# Patient Record
Sex: MALE | URBAN - NONMETROPOLITAN AREA
[De-identification: names, ages, dates, MRNs, and addresses within clinical notes are randomized per-mention and may not be internally consistent; named-entity substitution may affect disease eponyms.]

---

## 2019-04-18 ENCOUNTER — APPOINTMENT (OUTPATIENT)
Age: 84
Setting detail: DERMATOLOGY
End: 2019-04-18

## 2019-04-18 DIAGNOSIS — Z85.828 PERSONAL HISTORY OF OTHER MALIGNANT NEOPLASM OF SKIN: ICD-10-CM

## 2019-04-18 DIAGNOSIS — L57.8 OTHER SKIN CHANGES DUE TO CHRONIC EXPOSURE TO NONIONIZING RADIATION: ICD-10-CM

## 2019-04-18 DIAGNOSIS — L73.8 OTHER SPECIFIED FOLLICULAR DISORDERS: ICD-10-CM

## 2019-04-18 DIAGNOSIS — D485 NEOPLASM OF UNCERTAIN BEHAVIOR OF SKIN: ICD-10-CM

## 2019-04-18 DIAGNOSIS — L57.0 ACTINIC KERATOSIS: ICD-10-CM

## 2019-04-18 DIAGNOSIS — L82.1 OTHER SEBORRHEIC KERATOSIS: ICD-10-CM

## 2019-04-18 PROBLEM — I10 ESSENTIAL (PRIMARY) HYPERTENSION: Status: ACTIVE | Noted: 2019-04-18

## 2019-04-18 PROBLEM — D48.5 NEOPLASM OF UNCERTAIN BEHAVIOR OF SKIN: Status: ACTIVE | Noted: 2019-04-18

## 2019-04-18 PROCEDURE — OTHER COUNSELING: OTHER

## 2019-04-18 PROCEDURE — OTHER BIOPSY BY SHAVE METHOD: OTHER

## 2019-04-18 PROCEDURE — 11103 TANGNTL BX SKIN EA SEP/ADDL: CPT

## 2019-04-18 PROCEDURE — 11102 TANGNTL BX SKIN SINGLE LES: CPT | Mod: 59

## 2019-04-18 PROCEDURE — OTHER LIQUID NITROGEN: OTHER

## 2019-04-18 PROCEDURE — OTHER MIPS QUALITY: OTHER

## 2019-04-18 PROCEDURE — 99203 OFFICE O/P NEW LOW 30 MIN: CPT | Mod: 25

## 2019-04-18 PROCEDURE — 17004 DESTROY PREMAL LESIONS 15/>: CPT

## 2019-04-18 ASSESSMENT — LOCATION SIMPLE DESCRIPTION DERM
LOCATION SIMPLE: LEFT EAR
LOCATION SIMPLE: RIGHT FOREHEAD
LOCATION SIMPLE: NOSE
LOCATION SIMPLE: LEFT UPPER BACK
LOCATION SIMPLE: LEFT TEMPLE
LOCATION SIMPLE: CHEST
LOCATION SIMPLE: LEFT NOSE
LOCATION SIMPLE: RIGHT UPPER BACK
LOCATION SIMPLE: LEFT FOREHEAD
LOCATION SIMPLE: SCALP
LOCATION SIMPLE: RIGHT EAR
LOCATION SIMPLE: RIGHT CHEEK

## 2019-04-18 ASSESSMENT — LOCATION DETAILED DESCRIPTION DERM
LOCATION DETAILED: LEFT SUPERIOR PARIETAL SCALP
LOCATION DETAILED: LEFT INFERIOR FOREHEAD
LOCATION DETAILED: RIGHT FOREHEAD
LOCATION DETAILED: RIGHT SCAPHA
LOCATION DETAILED: LEFT SCAPHA
LOCATION DETAILED: LEFT LATERAL SUPERIOR CHEST
LOCATION DETAILED: LEFT CENTRAL TEMPLE
LOCATION DETAILED: LEFT MEDIAL SUPERIOR CHEST
LOCATION DETAILED: LEFT NASAL ALA
LOCATION DETAILED: LEFT ANTERIOR EARLOBE
LOCATION DETAILED: LEFT INFERIOR UPPER BACK
LOCATION DETAILED: LEFT INFERIOR MEDIAL UPPER BACK
LOCATION DETAILED: RIGHT CENTRAL MALAR CHEEK
LOCATION DETAILED: LEFT FOREHEAD
LOCATION DETAILED: LEFT NASAL DORSUM
LOCATION DETAILED: LEFT MEDIAL TEMPLE
LOCATION DETAILED: RIGHT NASAL DORSUM
LOCATION DETAILED: RIGHT LATERAL INFERIOR CHEST
LOCATION DETAILED: RIGHT CENTRAL BUCCAL CHEEK
LOCATION DETAILED: RIGHT SUPERIOR UPPER BACK
LOCATION DETAILED: RIGHT INFERIOR HELIX
LOCATION DETAILED: RIGHT INFERIOR FOREHEAD

## 2019-04-18 ASSESSMENT — LOCATION ZONE DERM
LOCATION ZONE: FACE
LOCATION ZONE: SCALP
LOCATION ZONE: EAR
LOCATION ZONE: TRUNK
LOCATION ZONE: NOSE

## 2019-04-18 NOTE — PROCEDURE: BIOPSY BY SHAVE METHOD
Bill 25000 For Specimen Handling/Conveyance To Laboratory?: no
Wound Care: Aquaphor
Type Of Destruction Used: Curettage
Biopsy Type: H and E
Anesthesia Volume In Cc: 0.5
Notification Instructions: Patient will be notified of biopsy results. However, patient instructed to call the office if not contacted within 2 weeks.
Dressing: bandage
Size Of Lesion In Cm: 0.4
Depth Of Biopsy: dermis
X Size Of Lesion In Cm: 0
Electrodesiccation And Curettage Text: The wound bed was treated with electrodesiccation and curettage after the biopsy was performed.
Hemostasis: Drysol
Size Of Lesion In Cm: 0.3
Biopsy Method: Dermablade
Silver Nitrate Text: The wound bed was treated with silver nitrate after the biopsy was performed.
Curettage Text: The wound bed was treated with curettage after the biopsy was performed.
Consent: Written consent was obtained and risks were reviewed including but not limited to scarring, infection, bleeding, scabbing, incomplete removal, nerve damage and allergy to anesthesia.
Was A Bandage Applied: Yes
Detail Level: Detailed
Billing Type: Third-Party Bill
Post-Care Instructions: I reviewed with the patient in detail post-care instructions. Patient is to keep the biopsy site dry overnight, and then apply bacitracin twice daily until healed. Patient may apply hydrogen peroxide soaks to remove any crusting.
Electrodesiccation Text: The wound bed was treated with electrodesiccation after the biopsy was performed.
Anesthesia Type: 1% lidocaine without epinephrine
Cryotherapy Text: The wound bed was treated with cryotherapy after the biopsy was performed.

## 2019-10-17 ENCOUNTER — APPOINTMENT (OUTPATIENT)
Age: 84
Setting detail: DERMATOLOGY
End: 2019-10-17

## 2019-10-17 DIAGNOSIS — D485 NEOPLASM OF UNCERTAIN BEHAVIOR OF SKIN: ICD-10-CM

## 2019-10-17 DIAGNOSIS — D18.0 HEMANGIOMA: ICD-10-CM

## 2019-10-17 DIAGNOSIS — L57.8 OTHER SKIN CHANGES DUE TO CHRONIC EXPOSURE TO NONIONIZING RADIATION: ICD-10-CM

## 2019-10-17 DIAGNOSIS — L82.1 OTHER SEBORRHEIC KERATOSIS: ICD-10-CM

## 2019-10-17 DIAGNOSIS — Z85.828 PERSONAL HISTORY OF OTHER MALIGNANT NEOPLASM OF SKIN: ICD-10-CM

## 2019-10-17 PROBLEM — D48.5 NEOPLASM OF UNCERTAIN BEHAVIOR OF SKIN: Status: ACTIVE | Noted: 2019-10-17

## 2019-10-17 PROBLEM — H91.90 UNSPECIFIED HEARING LOSS, UNSPECIFIED EAR: Status: ACTIVE | Noted: 2019-10-17

## 2019-10-17 PROBLEM — D18.01 HEMANGIOMA OF SKIN AND SUBCUTANEOUS TISSUE: Status: ACTIVE | Noted: 2019-10-17

## 2019-10-17 PROCEDURE — 99214 OFFICE O/P EST MOD 30 MIN: CPT | Mod: 25

## 2019-10-17 PROCEDURE — OTHER BIOPSY BY SHAVE METHOD: OTHER

## 2019-10-17 PROCEDURE — OTHER COUNSELING: OTHER

## 2019-10-17 PROCEDURE — 11102 TANGNTL BX SKIN SINGLE LES: CPT

## 2019-10-17 PROCEDURE — OTHER MIPS QUALITY: OTHER

## 2019-10-17 ASSESSMENT — LOCATION DETAILED DESCRIPTION DERM
LOCATION DETAILED: EPIGASTRIC SKIN
LOCATION DETAILED: PERIUMBILICAL SKIN
LOCATION DETAILED: LEFT INFERIOR UPPER BACK
LOCATION DETAILED: LEFT MEDIAL SUPERIOR CHEST
LOCATION DETAILED: LEFT MEDIAL MALAR CHEEK

## 2019-10-17 ASSESSMENT — LOCATION SIMPLE DESCRIPTION DERM
LOCATION SIMPLE: ABDOMEN
LOCATION SIMPLE: LEFT UPPER BACK
LOCATION SIMPLE: CHEST
LOCATION SIMPLE: LEFT CHEEK

## 2019-10-17 ASSESSMENT — LOCATION ZONE DERM
LOCATION ZONE: TRUNK
LOCATION ZONE: FACE

## 2019-10-17 NOTE — PROCEDURE: BIOPSY BY SHAVE METHOD
Bill For Surgical Tray: no
Depth Of Biopsy: dermis
Cryotherapy Text: The wound bed was treated with cryotherapy after the biopsy was performed.
Post-Care Instructions: I reviewed with the patient in detail post-care instructions. Patient is to keep the biopsy site dry overnight, and then apply bacitracin twice daily until healed. Patient may apply hydrogen peroxide soaks to remove any crusting.
Curettage Text: The wound bed was treated with curettage after the biopsy was performed.
Dressing: bandage
Biopsy Type: H and E
Detail Level: Detailed
Silver Nitrate Text: The wound bed was treated with silver nitrate after the biopsy was performed.
X Size Of Lesion In Cm: 0
Wound Care: Petrolatum
Electrodesiccation Text: The wound bed was treated with electrodesiccation after the biopsy was performed.
Size Of Lesion In Cm: 0.3
Type Of Destruction Used: Curettage
Electrodesiccation And Curettage Text: The wound bed was treated with electrodesiccation and curettage after the biopsy was performed.
Hemostasis: Drysol
Billing Type: Third-Party Bill
Consent: Written consent was obtained and risks were reviewed including but not limited to scarring, infection, bleeding, scabbing, incomplete removal, nerve damage and allergy to anesthesia. Clinical evaluation reveals changes suspicious for rule out provided in path req. A skin biopsy is considered a necessary and appropriate to clarify the diagnosis. A biopsy is performed at the time of visit by technique using using a scalpel blade.
Was A Bandage Applied: Yes
Notification Instructions: Patient will be notified of biopsy results. However, patient instructed to call the office if not contacted within 2 weeks.
Biopsy Method: Dermablade
Anesthesia Type: 1% lidocaine with epinephrine

## 2020-05-06 ENCOUNTER — APPOINTMENT (OUTPATIENT)
Age: 85
Setting detail: DERMATOLOGY
End: 2020-05-06

## 2020-05-06 VITALS — TEMPERATURE: 97.6 F

## 2020-05-06 DIAGNOSIS — L82.1 OTHER SEBORRHEIC KERATOSIS: ICD-10-CM

## 2020-05-06 DIAGNOSIS — Z85.828 PERSONAL HISTORY OF OTHER MALIGNANT NEOPLASM OF SKIN: ICD-10-CM

## 2020-05-06 DIAGNOSIS — L57.8 OTHER SKIN CHANGES DUE TO CHRONIC EXPOSURE TO NONIONIZING RADIATION: ICD-10-CM

## 2020-05-06 DIAGNOSIS — L57.0 ACTINIC KERATOSIS: ICD-10-CM

## 2020-05-06 DIAGNOSIS — Z71.89 OTHER SPECIFIED COUNSELING: ICD-10-CM

## 2020-05-06 DIAGNOSIS — D485 NEOPLASM OF UNCERTAIN BEHAVIOR OF SKIN: ICD-10-CM

## 2020-05-06 PROBLEM — D48.5 NEOPLASM OF UNCERTAIN BEHAVIOR OF SKIN: Status: ACTIVE | Noted: 2020-05-06

## 2020-05-06 PROCEDURE — 17000 DESTRUCT PREMALG LESION: CPT | Mod: 59

## 2020-05-06 PROCEDURE — OTHER COUNSELING: OTHER

## 2020-05-06 PROCEDURE — 11102 TANGNTL BX SKIN SINGLE LES: CPT

## 2020-05-06 PROCEDURE — OTHER LIQUID NITROGEN: OTHER

## 2020-05-06 PROCEDURE — OTHER MIPS QUALITY: OTHER

## 2020-05-06 PROCEDURE — 99214 OFFICE O/P EST MOD 30 MIN: CPT | Mod: 25

## 2020-05-06 PROCEDURE — 17003 DESTRUCT PREMALG LES 2-14: CPT

## 2020-05-06 PROCEDURE — OTHER BIOPSY BY SHAVE METHOD: OTHER

## 2020-05-06 ASSESSMENT — LOCATION SIMPLE DESCRIPTION DERM
LOCATION SIMPLE: NECK
LOCATION SIMPLE: NOSE
LOCATION SIMPLE: POSTERIOR SCALP
LOCATION SIMPLE: CHEST
LOCATION SIMPLE: LEFT UPPER BACK

## 2020-05-06 ASSESSMENT — LOCATION DETAILED DESCRIPTION DERM
LOCATION DETAILED: MID-OCCIPITAL SCALP
LOCATION DETAILED: LEFT SUPERIOR LATERAL NECK
LOCATION DETAILED: LEFT LATERAL SUPERIOR CHEST
LOCATION DETAILED: LEFT INFERIOR MEDIAL UPPER BACK
LOCATION DETAILED: RIGHT NASAL DORSUM

## 2020-05-06 ASSESSMENT — LOCATION ZONE DERM
LOCATION ZONE: SCALP
LOCATION ZONE: NOSE
LOCATION ZONE: NECK
LOCATION ZONE: TRUNK

## 2020-05-06 NOTE — PROCEDURE: BIOPSY BY SHAVE METHOD
Bill For Surgical Tray: no
Anesthesia Type: 1% lidocaine without epinephrine
Electrodesiccation Text: The wound bed was treated with electrodesiccation after the biopsy was performed.
Notification Instructions: Patient will be notified of biopsy results. However, patient instructed to call the office if not contacted within 2 weeks.
Detail Level: Detailed
Consent: Written consent was obtained and risks were reviewed including but not limited to scarring, infection, bleeding, scabbing, incomplete removal, nerve damage and allergy to anesthesia.
Size Of Lesion In Cm: 0.3
Biopsy Method: Dermablade
Dressing: bandage
Curettage Text: The wound bed was treated with curettage after the biopsy was performed.
Billing Type: Third-Party Bill
Was A Bandage Applied: Yes
Electrodesiccation And Curettage Text: The wound bed was treated with electrodesiccation and curettage after the biopsy was performed.
Information: Selecting Yes will display possible errors in your note based on the variables you have selected. This validation is only offered as a suggestion for you. PLEASE NOTE THAT THE VALIDATION TEXT WILL BE REMOVED WHEN YOU FINALIZE YOUR NOTE. IF YOU WANT TO FAX A PRELIMINARY NOTE YOU WILL NEED TO TOGGLE THIS TO 'NO' IF YOU DO NOT WANT IT IN YOUR FAXED NOTE.
Type Of Destruction Used: Curettage
Biopsy Type: H and E
X Size Of Lesion In Cm: 0
Wound Care: Petrolatum
Cryotherapy Text: The wound bed was treated with cryotherapy after the biopsy was performed.
Post-Care Instructions: I reviewed with the patient in detail post-care instructions. Patient is to keep the biopsy site dry overnight, and then apply bacitracin twice daily until healed. Patient may apply hydrogen peroxide soaks to remove any crusting.
Anesthesia Volume In Cc: 1
Depth Of Biopsy: dermis
Hemostasis: Drysol
Silver Nitrate Text: The wound bed was treated with silver nitrate after the biopsy was performed.

## 2020-05-06 NOTE — PROCEDURE: LIQUID NITROGEN
Render Post-Care Instructions In Note?: no
Consent: The patient's consent was obtained including but not limited to risks of crusting, scabbing, blistering, scarring, darker or lighter pigmentary change, recurrence, incomplete removal and infection.
Post-Care Instructions: I reviewed with the patient in detail post-care instructions. Patient is to wear sunprotection, and avoid picking at any of the treated lesions. Pt may apply Vaseline to crusted or scabbing areas.
Number Of Freeze-Thaw Cycles: 2 freeze-thaw cycles
Duration Of Freeze Thaw-Cycle (Seconds): 0
Detail Level: Detailed

## 2020-05-27 ENCOUNTER — APPOINTMENT (OUTPATIENT)
Age: 85
Setting detail: DERMATOLOGY
End: 2020-05-27

## 2020-05-27 ENCOUNTER — APPOINTMENT (OUTPATIENT)
Age: 85
Setting detail: DERMATOLOGY
End: 2020-06-03

## 2020-05-27 VITALS — TEMPERATURE: 98.5 F

## 2020-05-27 PROBLEM — C44.321 SQUAMOUS CELL CARCINOMA OF SKIN OF NOSE: Status: ACTIVE | Noted: 2020-05-27

## 2020-05-27 PROCEDURE — 77300 RADIATION THERAPY DOSE PLAN: CPT

## 2020-05-27 PROCEDURE — OTHER SUPERFICIAL RADIATION TREATMENT: OTHER

## 2020-05-27 PROCEDURE — 77261 THER RADIOLOGY TX PLNG SMPL: CPT

## 2020-05-27 PROCEDURE — OTHER TREATMENT REGIMEN: OTHER

## 2020-05-27 PROCEDURE — G6001 ECHO GUIDANCE RADIOTHERAPY: HCPCS

## 2020-05-27 PROCEDURE — 99213 OFFICE O/P EST LOW 20 MIN: CPT | Mod: 25

## 2020-05-27 PROCEDURE — OTHER FOLLOW UP FOR NEXT VISIT: OTHER

## 2020-05-27 PROCEDURE — 77334 RADIATION TREATMENT AID(S): CPT

## 2020-05-27 PROCEDURE — 77280 THER RAD SIMULAJ FIELD SMPL: CPT

## 2020-05-27 NOTE — PROCEDURE: SUPERFICIAL RADIATION TREATMENT
Fractions / Week: 4
Dose Per Fractionation In Cgy (Optional): 264.88
Daily Fractionated Dose (Optional- Include Units) Rx 2: 268.28cGy
Energy (Include Units): 50kV
Number Of Treatment Days: 1
Custom Shielding Preamble Text Will Not Be Included With Simple Simulations (.......... X X Y Cm): A lead shield of 0.762 mm thickness is utilized to form a molded, custom shield with a
Field Size (Applicator): 2.5 cm
Validate Note Data (See Information Below): Yes
Total Number Of Fractions Rx 3: 15
Total Dose (Optional-Please Include Units): 5297.6cGy
Fractions / Week Rx 2: 3
Energy (Optional-Please Include Units): 70kV
Fractionation Number (Evaluation): 5
Treatment Device Design After Initial Simulation Justification (Will Render If Bill For Treatment Devices = Yes): The patient is status post radiation simulation and is evaluated as to the use of additional devices for shielding and placement for radiation therapy.
Field Size (Applicator) Rx 2: 2.0 cm
Bill For Treatment Devices Only: No
Treatment Time / Fractionation (Optional- Include Units): 0.43min
Total Dose (Optional-Please Include Units) Rx 2: 2682.8cGy
Dose / Tx In Cgy (Optional): 383.5
Simple Simulation Preamble Text Will Be Included With Simple Simulations (.......... Indications): Simple simulation was performed today for the following reasons:
Treatment Time / Fractionation (Optional- Include Units) Rx 2: 0.38min
Detail Level: Detailed
Time Dose Fractionation (Optional- Include Units If Applicable): 97
Intro Statement (Will Not Render If Left Blank): The patient is undergoing superficial radiation therapy for skin cancer and presents for weekly evaluation and management.  Per protocol and as documented on the flow sheet, the patient was questioned as to subjective redness, pruritus, pain, drainage, fatigue, or any other symptoms.  Objectively, the radiation area was evaluated with regards to erythema, atrophy, scale, crusting, erosion, ulceration, edema, purpura, tenderness, warmth, drainage, and any other findings.  The plan was extensively reviewed including the dose, and dosing schedule.  The simulation and clinical setup was also reviewed as was the external and any internal shields and based on this review the appropriateness and sufficiency of treatment was determined.
Custom Shielding Afterword Text Will Not Be Included With Simple Simulations (X X Y Cm............): port to correlate with the lesion size, including treatment margin. The custom lead shield is adequate to accommodate the appropriate applicator and provide adequate shielding around the treatment site. Additional shielding (as noted below) is used to protect sensitive, normal tissues.
Patient Positioning: Supine
Depth (Optional-Please Include Units): 0.8mm
Port Dimensions-X Axis In Cm: 2
Assessment: Appropriate reaction
Time Dose Fractionation (Optional- Include Units If Applicable) Rx 2: 47
Functional Status: 0 (fully active)
Shielding Size (Optional- Include Units): 2.0cm x 2.0cm
Information: Selecting Yes will display possible errors in your note based on the variables you have selected. This validation is only offered as a suggestion for you. PLEASE NOTE THAT THE VALIDATION TEXT WILL BE REMOVED WHEN YOU FINALIZE YOUR NOTE. IF YOU WANT TO FAX A PRELIMINARY NOTE YOU WILL NEED TO TOGGLE THIS TO 'NO' IF YOU DO NOT WANT IT IN YOUR FAXED NOTE.
Total Number Of Fractions: 20
Depth (Optional-Please Include Units) Rx 2: 0.7mm
Simple Simulation Afterword Text Will Be Included With Simple Simulations (Indications............): The patient had a complete consultation regarding all applicable modalities for the treatment of their skin cancer and based on a variety of factors including the type of tumor, size, and location, the relevant medical history as well as local tissue factors, the functional status of the individual, the ability to perform necessary postoperative wound instructions and the need for simultaneous treatments as well as overall wound healing status, it was determined that the patient would begin radiation therapy treatment for skin cancer.  A full simulation and treatment device design was performed including the determination and formulation of appropriate simple and complex devices including lead shield of 0.762 mm thickness to form molded customized shielding to specifically correlate with the lesion size including treatment margin.  The custom lead shield is adequate to accommodate the appropriate applicator and provide adequate shielding around the treatment site.  The specific field applicator, shields, and devices both simple and complex as well as the specific patient setup is outlined below.  The patient was given a full consent for superficial radiation to both verbally and in writing and the full determination of patient's eligibility for treatment and selection is outlined on the patient eligibility and treatment selection form.  The specific superficial radiotherapy prescription was determined and was documented on the superficial radiotherapy prescription form.  A treatment calculation was also performed and documented on the treatment calculation form.  Based on the prescription, the patient was scheduled for a series of fractional treatments.
Treatment Margins In Cm: 0.5
Daily Fractionated Dose (Optional- Include Units): 264.88cGy
Shielding Size (Optional- Include Units) Rx 2: 1.5cm x 1.5cm
Computed Treatment Time In Min (Will Render The Same As Calculated Treatment Time If Left Blank): 0.43
Total Number Of Fractions Rx 2: 10
Treatment Time In Min (Optional): 0.50
Fractionation Number: 0

## 2020-05-27 NOTE — PROCEDURE: TREATMENT REGIMEN
Plan: Per the request of Dr. Esteban, patient was seen today for Superficial Radiation Therapy requiring simulation (CPT® 17584) in preparation for treatment of specific diseased site(s). Simulation is necessary to determine correct patient and treatment portal positioning, deliver safe and effective radiation therapy. A high frequency ultrasound image was acquired today for three-dimensional evaluation of tumor volume and response to treatment, in addition, geometric accuracy of field placement (CPT® ). Physician evaluation of the ultrasound tumor depth will be ongoing through course of treatment and is deemed medically necessary ensuring efficacy of treatment. Today’s image and setup was evaluated determining continuation of treatment with the current plan, or necessary changes as appropriate. All appropriate custom blocking and treatment parameters verified by the Radiation Therapist\\n\\nToday’s visit is for Simulation and planning for radiation therapy.  Question were answered and concerns were address.  Patient was evaluated based on listed criteria and is a suitable candidate to begin SRT.  Ultrasound was used to confirm treatment location and determine depth of treatment.  Depth: 0.8mm\\n
Detail Level: Detailed

## 2020-06-01 ENCOUNTER — APPOINTMENT (OUTPATIENT)
Age: 85
Setting detail: DERMATOLOGY
End: 2020-06-03

## 2020-06-01 PROBLEM — C44.321 SQUAMOUS CELL CARCINOMA OF SKIN OF NOSE: Status: ACTIVE | Noted: 2020-06-01

## 2020-06-01 PROCEDURE — OTHER TREATMENT REGIMEN: OTHER

## 2020-06-01 PROCEDURE — OTHER FOLLOW UP FOR NEXT VISIT: OTHER

## 2020-06-01 PROCEDURE — 77280 THER RAD SIMULAJ FIELD SMPL: CPT

## 2020-06-01 PROCEDURE — G6001 ECHO GUIDANCE RADIOTHERAPY: HCPCS

## 2020-06-01 PROCEDURE — OTHER SUPERFICIAL RADIATION TREATMENT: OTHER

## 2020-06-01 PROCEDURE — 77401 RADIATION TX DELIVERY SUPFC: CPT

## 2020-06-01 NOTE — PROCEDURE: SUPERFICIAL RADIATION TREATMENT
Bill And Render Text From Evaluation And Management Tab (Will Bill [id number]): No
Intro Statement (Will Not Render If Left Blank): The patient is undergoing superficial radiation therapy for skin cancer and presents for weekly evaluation and management.  Per protocol and as documented on the flow sheet, the patient was questioned as to subjective redness, pruritus, pain, drainage, fatigue, or any other symptoms.  Objectively, the radiation area was evaluated with regards to erythema, atrophy, scale, crusting, erosion, ulceration, edema, purpura, tenderness, warmth, drainage, and any other findings.  The plan was extensively reviewed including the dose, and dosing schedule.  The simulation and clinical setup was also reviewed as was the external and any internal shields and based on this review the appropriateness and sufficiency of treatment was determined.
Computed Treatment Time In Min (Will Render The Same As Calculated Treatment Time If Left Blank): 0.43
Fractionation Number: 1
Functional Status: 0 (fully active)
Time Dose Fractionation (Optional- Include Units If Applicable): 97
Custom Shielding Preamble Text Will Not Be Included With Simple Simulations (.......... X X Y Cm): A lead shield of 0.762 mm thickness is utilized to form a molded, custom shield with a
Fractions / Week Rx 2: 3
Assessment: Appropriate reaction
Treatment Device Design After Initial Simulation Justification (Will Render If Bill For Treatment Devices = Yes): The patient is status post radiation simulation and is evaluated as to the use of additional devices for shielding and placement for radiation therapy.
Treatment Margins In Cm: 0.5
Total Dose (Optional-Please Include Units): 5297.6cGy
Depth (Optional-Please Include Units): 0.8mm
Day Of The Week Treatment Administered: Monday
Detail Level: Detailed
Treatment Time / Fractionation (Optional- Include Units) Rx 2: 0.38min
Daily Fractionated Dose (Optional- Include Units) Rx 2: 268.28cGy
Total Number Of Fractions: 20
Energy (Optional-Please Include Units) Rx 2: 50kV
Depth (Optional-Please Include Units) Rx 2: 0.7mm
Field Size (Applicator): 2.5 cm
Fractions / Week: 4
Simple Simulation Preamble Text Will Be Included With Simple Simulations (.......... Indications): Simple simulation was performed today for the following reasons:
Dose Per Fractionation In Cgy (Optional): 264.88
Time Dose Fractionation (Optional- Include Units If Applicable) Rx 2: 47
Patient Positioning: Supine
Initial Radiation Treatment Planning (Will Render If Bill Simulation = Yes): The patient had a complete consultation regarding all applicable modalities for the treatment of their skin cancer and based on a variety of factors including the type of tumor, size, and location, the relevant medical history as well as local tissue factors, the functional status of the individual, the ability to perform necessary postoperative wound instructions and the need for simultaneous treatments as well as overall wound healing status, it was determined that the patient would begin radiation therapy treatment for skin cancer.  A full simulation and treatment device design was performed including the determination and formulation of appropriate simple and complex devices including lead shield of 0.762 mm thickness to form molded customized shielding to specifically correlate with the lesion size including treatment margin.  The custom lead shield is adequate to accommodate the appropriate applicator and provide adequate shielding around the treatment site.  The specific field applicator, shields, and devices both simple and complex as well as the specific patient setup is outlined below.  The patient was given a full consent for superficial radiation to both verbally and in writing and the full determination of patient's eligibility for treatment and selection is outlined on the patient eligibility and treatment selection form.  The specific superficial radiotherapy prescription was determined and was documented on the superficial radiotherapy prescription form.  A treatment calculation was also performed and documented on the treatment calculation form.  Based on the prescription, the patient was scheduled for a series of fractional treatments.
Information: Selecting Yes will display possible errors in your note based on the variables you have selected. This validation is only offered as a suggestion for you. PLEASE NOTE THAT THE VALIDATION TEXT WILL BE REMOVED WHEN YOU FINALIZE YOUR NOTE. IF YOU WANT TO FAX A PRELIMINARY NOTE YOU WILL NEED TO TOGGLE THIS TO 'NO' IF YOU DO NOT WANT IT IN YOUR FAXED NOTE.
Daily Fractionated Dose (Optional- Include Units): 264.88cGy
Custom Shielding Afterword Text Will Not Be Included With Simple Simulations (X X Y Cm............): port to correlate with the lesion size, including treatment margin. The custom lead shield is adequate to accommodate the appropriate applicator and provide adequate shielding around the treatment site. Additional shielding (as noted below) is used to protect sensitive, normal tissues.
Fractionation Number (Evaluation): 5
Shielding Size (Optional- Include Units) Rx 2: 1.5cm x 1.5cm
Total Dose (Optional-Please Include Units) Rx 2: 2682.8cGy
Port Dimensions-X Axis In Cm: 2
Treatment Time / Fractionation (Optional- Include Units): 0.43min
Total Number Of Fractions Rx 3: 15
Shielding Size (Optional- Include Units): 2.0cm x 2.0cm
Energy (Include Units): 70kV
Bill For Radiation Treatment: Yes
Field Size (Applicator) Rx 2: 2.0 cm
Total Number Of Fractions Rx 2: 10

## 2020-06-01 NOTE — PROCEDURE: TREATMENT REGIMEN
Detail Level: Detailed
Plan: This patient has been treated today with superficial radiation therapy for non-melanoma skin cancer.    \\n\\nWritten informed consent has been previously obtained from this patient for this treatment.  This consent is documented in the patient’s chart.  The patient gave verbal consent to continue treatment today. \\n\\nThe patient was treated with a specific radiation dose and setup as prescribed by the provider listed on this visit note.  A Radiation Therapist performed administration of radiation. The treatment parameters and cumulative dose are indicated above.  \\n\\nPrior to administering the radiation, the patient underwent a verification simulation defining relevant normal and abnormal target anatomy and acquiring images and data necessary to develop optimal radiation treatment process for the patient. This process includes verification of the treatment port(s) and proper treatment positioning.  All treatment ports were photographed.  The patient’s customized lead blocking was confirmed.   Considering, superficial radiotherapy setup is a clinical setup, this requires physician and radiation therapist to clarify location interest being treated against initial images, pathology and patient anatomy. Care was taken ensuring fields treated were geometrically accurate and properly positioned using daily verification simulation.  This process is also utilized to determine if any changes are necessary.   These steps are therefore medically necessary ensuring safe and effective administration of radiation. \\n\\nA high frequency ultrasound image was acquired today for two-dimensional evaluation of the tumor volume and response to treatment, in addition to geometric accuracy of field placement. The daily field placement is separate and distinct from the initial simulation and is an important task in providing safe administration of radiation therapy. Physician evaluation of the ultrasound tumor depth will be ongoing throughout the course of treatment and is deemed medically necessary in order to ensure the efficacy of treatment. Today’s image was evaluated for determination of continuation of treatment with the current plan or with necessary changes as appropriate. Additionally, the use of visualization and targeted assessment allows the patient to be able to see their cancer(s) progress, encouraging patient to complete full course of radiotherapy.  \\n\\nUS depth is 1.04mm, which is 0.24mm in difference from previous imaging. Repop +++\\n\\nPer Dr. Esteban, continued daily US guidance and clinical setup simulation is required for field placement, measurement of tumor depth, progress and acute effect monitoring. \\n\\n

## 2020-06-02 ENCOUNTER — APPOINTMENT (OUTPATIENT)
Age: 85
Setting detail: DERMATOLOGY
End: 2020-06-03

## 2020-06-02 VITALS — TEMPERATURE: 97.3 F

## 2020-06-02 PROBLEM — C44.321 SQUAMOUS CELL CARCINOMA OF SKIN OF NOSE: Status: ACTIVE | Noted: 2020-06-02

## 2020-06-02 PROCEDURE — G6001 ECHO GUIDANCE RADIOTHERAPY: HCPCS

## 2020-06-02 PROCEDURE — OTHER TREATMENT REGIMEN: OTHER

## 2020-06-02 PROCEDURE — OTHER SUPERFICIAL RADIATION TREATMENT: OTHER

## 2020-06-02 PROCEDURE — 77401 RADIATION TX DELIVERY SUPFC: CPT

## 2020-06-02 PROCEDURE — OTHER FOLLOW UP FOR NEXT VISIT: OTHER

## 2020-06-02 PROCEDURE — 77280 THER RAD SIMULAJ FIELD SMPL: CPT

## 2020-06-02 NOTE — PROCEDURE: TREATMENT REGIMEN
Detail Level: Detailed
Plan: This patient has been treated today with superficial radiation therapy for non-melanoma skin cancer.    \\n\\nWritten informed consent has been previously obtained from this patient for this treatment.  This consent is documented in the patient’s chart.  The patient gave verbal consent to continue treatment today. \\n\\nThe patient was treated with a specific radiation dose and setup as prescribed by the provider listed on this visit note.  A Radiation Therapist performed administration of radiation. The treatment parameters and cumulative dose are indicated above.  \\n\\nPrior to administering the radiation, the patient underwent a verification simulation defining relevant normal and abnormal target anatomy and acquiring images and data necessary to develop optimal radiation treatment process for the patient. This process includes verification of the treatment port(s) and proper treatment positioning.  All treatment ports were photographed.  The patient’s customized lead blocking was confirmed.   Considering, superficial radiotherapy setup is a clinical setup, this requires physician and radiation therapist to clarify location interest being treated against initial images, pathology and patient anatomy. Care was taken ensuring fields treated were geometrically accurate and properly positioned using daily verification simulation.  This process is also utilized to determine if any changes are necessary.   These steps are therefore medically necessary ensuring safe and effective administration of radiation. \\n\\nA high frequency ultrasound image was acquired today for two-dimensional evaluation of the tumor volume and response to treatment, in addition to geometric accuracy of field placement. The daily field placement is separate and distinct from the initial simulation and is an important task in providing safe administration of radiation therapy. Physician evaluation of the ultrasound tumor depth will be ongoing throughout the course of treatment and is deemed medically necessary in order to ensure the efficacy of treatment. Today’s image was evaluated for determination of continuation of treatment with the current plan or with necessary changes as appropriate. Additionally, the use of visualization and targeted assessment allows the patient to be able to see their cancer(s) progress, encouraging patient to complete full course of radiotherapy.  \\n\\nUS depth is 0.69mm, which is -0.35mm in difference from previous imaging. Repop +++\\n\\nPer Dr. Esteban, continued daily US guidance and clinical setup simulation is required for field placement, measurement of tumor depth, progress and acute effect monitoring. \\n\\n

## 2020-06-02 NOTE — PROCEDURE: SUPERFICIAL RADIATION TREATMENT
Time Dose Fractionation (Optional- Include Units If Applicable): 97
Time Dose Fractionation (Optional- Include Units If Applicable) Rx 2: 47
Functional Status: 0 (fully active)
Shielding Size (Optional- Include Units): 2.0cm x 2.0cm
Fractionation Number (Evaluation): 5
Field Size (Applicator): 2.5 cm
Ricky For Simulation Without Treatment Device Design (Simple Simulation): No
Port Dimensions-X Axis In Cm: 2
Dose / Tx In Cgy (Optional): 264.88
Patient Positioning: Supine
Prescription Used: 1
Intro Statement (Will Not Render If Left Blank): The patient is undergoing superficial radiation therapy for skin cancer and presents for weekly evaluation and management.  Per protocol and as documented on the flow sheet, the patient was questioned as to subjective redness, pruritus, pain, drainage, fatigue, or any other symptoms.  Objectively, the radiation area was evaluated with regards to erythema, atrophy, scale, crusting, erosion, ulceration, edema, purpura, tenderness, warmth, drainage, and any other findings.  The plan was extensively reviewed including the dose, and dosing schedule.  The simulation and clinical setup was also reviewed as was the external and any internal shields and based on this review the appropriateness and sufficiency of treatment was determined.
Energy (Optional-Please Include Units): 70kV
Total Dose (Optional-Please Include Units): 5297.6cGy
Treatment Time / Fractionation (Optional- Include Units) Rx 2: 0.38min
Shielding Size (Optional- Include Units) Rx 2: 1.5cm x 1.5cm
Information: Selecting Yes will display possible errors in your note based on the variables you have selected. This validation is only offered as a suggestion for you. PLEASE NOTE THAT THE VALIDATION TEXT WILL BE REMOVED WHEN YOU FINALIZE YOUR NOTE. IF YOU WANT TO FAX A PRELIMINARY NOTE YOU WILL NEED TO TOGGLE THIS TO 'NO' IF YOU DO NOT WANT IT IN YOUR FAXED NOTE.
Treatment Device Design After Initial Simulation Justification (Will Render If Bill For Treatment Devices = Yes): The patient is status post radiation simulation and is evaluated as to the use of additional devices for shielding and placement for radiation therapy.
Simple Simulation Preamble Text Will Be Included With Simple Simulations (.......... Indications): Simple simulation was performed today for the following reasons:
Total Dose (Optional-Please Include Units) Rx 2: 2682.8cGy
Ssd In Cm (Optional): 15
Treatment Time In Min (Optional): 0.43
Fractions / Week Rx 2: 3
Daily Fractionated Dose (Optional- Include Units): 264.88cGy
Total Number Of Fractions: 20
Custom Shielding Preamble Text Will Not Be Included With Simple Simulations (.......... X X Y Cm): A lead shield of 0.762 mm thickness is utilized to form a molded, custom shield with a
Bill For Radiation Treatment: Yes
Depth (Optional-Please Include Units) Rx 2: 0.7mm
Day Of The Week Treatment Administered: Tuesday
Field Size (Applicator) Rx 2: 2.0 cm
Assessment: Appropriate reaction
Treatment Margins In Cm: 0.5
Depth (Optional-Please Include Units): 0.8mm
Total Number Of Fractions Rx 2: 10
Daily Fractionated Dose (Optional- Include Units) Rx 2: 268.28cGy
Energy (Optional-Please Include Units) Rx 2: 50kV
Treatment Time / Fractionation (Optional- Include Units): 0.43min
Cumulative Dose In Cgy (Optional): 529.76
Initial Radiation Treatment Planning (Will Render If Bill Simulation = Yes): The patient had a complete consultation regarding all applicable modalities for the treatment of their skin cancer and based on a variety of factors including the type of tumor, size, and location, the relevant medical history as well as local tissue factors, the functional status of the individual, the ability to perform necessary postoperative wound instructions and the need for simultaneous treatments as well as overall wound healing status, it was determined that the patient would begin radiation therapy treatment for skin cancer.  A full simulation and treatment device design was performed including the determination and formulation of appropriate simple and complex devices including lead shield of 0.762 mm thickness to form molded customized shielding to specifically correlate with the lesion size including treatment margin.  The custom lead shield is adequate to accommodate the appropriate applicator and provide adequate shielding around the treatment site.  The specific field applicator, shields, and devices both simple and complex as well as the specific patient setup is outlined below.  The patient was given a full consent for superficial radiation to both verbally and in writing and the full determination of patient's eligibility for treatment and selection is outlined on the patient eligibility and treatment selection form.  The specific superficial radiotherapy prescription was determined and was documented on the superficial radiotherapy prescription form.  A treatment calculation was also performed and documented on the treatment calculation form.  Based on the prescription, the patient was scheduled for a series of fractional treatments.
Detail Level: Detailed
Fractions / Week: 4
Custom Shielding Afterword Text Will Not Be Included With Simple Simulations (X X Y Cm............): port to correlate with the lesion size, including treatment margin. The custom lead shield is adequate to accommodate the appropriate applicator and provide adequate shielding around the treatment site. Additional shielding (as noted below) is used to protect sensitive, normal tissues.

## 2020-06-04 ENCOUNTER — APPOINTMENT (OUTPATIENT)
Age: 85
Setting detail: DERMATOLOGY
End: 2020-06-09

## 2020-06-04 VITALS — TEMPERATURE: 97.9 F

## 2020-06-04 PROBLEM — C44.321 SQUAMOUS CELL CARCINOMA OF SKIN OF NOSE: Status: ACTIVE | Noted: 2020-06-04

## 2020-06-04 PROCEDURE — OTHER FOLLOW UP FOR NEXT VISIT: OTHER

## 2020-06-04 PROCEDURE — 77280 THER RAD SIMULAJ FIELD SMPL: CPT

## 2020-06-04 PROCEDURE — OTHER TREATMENT REGIMEN: OTHER

## 2020-06-04 PROCEDURE — OTHER SUPERFICIAL RADIATION TREATMENT: OTHER

## 2020-06-04 PROCEDURE — 77401 RADIATION TX DELIVERY SUPFC: CPT

## 2020-06-04 PROCEDURE — G6001 ECHO GUIDANCE RADIOTHERAPY: HCPCS

## 2020-06-04 NOTE — PROCEDURE: SUPERFICIAL RADIATION TREATMENT
Energy (Optional-Please Include Units) Rx 2: 50kV
Field Size (Applicator): 2.5 cm
Include Rx 2 When Rendering Additional Prescriptions: No
Treatment Time / Fractionation (Optional- Include Units) Rx 2: 0.38min
Ssd In Cm (Optional): 15
Detail Level: Detailed
Custom Shielding Afterword Text Will Not Be Included With Simple Simulations (X X Y Cm............): port to correlate with the lesion size, including treatment margin. The custom lead shield is adequate to accommodate the appropriate applicator and provide adequate shielding around the treatment site. Additional shielding (as noted below) is used to protect sensitive, normal tissues.
Time Dose Fractionation (Optional- Include Units If Applicable): 97
Simple Simulation Preamble Text Will Be Included With Simple Simulations (.......... Indications): Simple simulation was performed today for the following reasons:
Shielding Size (Optional- Include Units) Rx 2: 1.5cm x 1.5cm
Field Size (Applicator) Rx 2: 2.0 cm
Day Of The Week Treatment Administered: Thursday
Dose / Tx In Cgy (Optional): 264.88
Computed Treatment Time In Min (Will Render The Same As Calculated Treatment Time If Left Blank): 0.43
Total Number Of Fractions Rx 2: 10
Functional Status: 0 (fully active)
Information: Selecting Yes will display possible errors in your note based on the variables you have selected. This validation is only offered as a suggestion for you. PLEASE NOTE THAT THE VALIDATION TEXT WILL BE REMOVED WHEN YOU FINALIZE YOUR NOTE. IF YOU WANT TO FAX A PRELIMINARY NOTE YOU WILL NEED TO TOGGLE THIS TO 'NO' IF YOU DO NOT WANT IT IN YOUR FAXED NOTE.
Number Of Days Off Treatment: 1
Fractionation Number: 3
Treatment Device Design After Initial Simulation Justification (Will Render If Bill For Treatment Devices = Yes): The patient is status post radiation simulation and is evaluated as to the use of additional devices for shielding and placement for radiation therapy.
Port Dimensions-X Axis In Cm: 2
Initial Radiation Treatment Planning (Will Render If Bill Simulation = Yes): The patient had a complete consultation regarding all applicable modalities for the treatment of their skin cancer and based on a variety of factors including the type of tumor, size, and location, the relevant medical history as well as local tissue factors, the functional status of the individual, the ability to perform necessary postoperative wound instructions and the need for simultaneous treatments as well as overall wound healing status, it was determined that the patient would begin radiation therapy treatment for skin cancer.  A full simulation and treatment device design was performed including the determination and formulation of appropriate simple and complex devices including lead shield of 0.762 mm thickness to form molded customized shielding to specifically correlate with the lesion size including treatment margin.  The custom lead shield is adequate to accommodate the appropriate applicator and provide adequate shielding around the treatment site.  The specific field applicator, shields, and devices both simple and complex as well as the specific patient setup is outlined below.  The patient was given a full consent for superficial radiation to both verbally and in writing and the full determination of patient's eligibility for treatment and selection is outlined on the patient eligibility and treatment selection form.  The specific superficial radiotherapy prescription was determined and was documented on the superficial radiotherapy prescription form.  A treatment calculation was also performed and documented on the treatment calculation form.  Based on the prescription, the patient was scheduled for a series of fractional treatments.
Custom Shielding Preamble Text Will Not Be Included With Simple Simulations (.......... X X Y Cm): A lead shield of 0.762 mm thickness is utilized to form a molded, custom shield with a
Energy (Optional-Please Include Units): 70kV
Daily Fractionated Dose (Optional- Include Units) Rx 2: 268.28cGy
Bill For Radiation Treatment: Yes
Treatment Time / Fractionation (Optional- Include Units): 0.43min
Shielding Size (Optional- Include Units): 2.0cm x 2.0cm
Total Number Of Fractions: 20
Fractions / Week Rx 3: 5
Depth (Optional-Please Include Units): 0.8mm
Total Dose (Optional-Please Include Units): 5297.6cGy
Intro Statement (Will Not Render If Left Blank): The patient is undergoing superficial radiation therapy for skin cancer and presents for weekly evaluation and management.  Per protocol and as documented on the flow sheet, the patient was questioned as to subjective redness, pruritus, pain, drainage, fatigue, or any other symptoms.  Objectively, the radiation area was evaluated with regards to erythema, atrophy, scale, crusting, erosion, ulceration, edema, purpura, tenderness, warmth, drainage, and any other findings.  The plan was extensively reviewed including the dose, and dosing schedule.  The simulation and clinical setup was also reviewed as was the external and any internal shields and based on this review the appropriateness and sufficiency of treatment was determined.
Time Dose Fractionation (Optional- Include Units If Applicable) Rx 2: 47
Fractions / Week: 4
Total Dose (Optional-Please Include Units) Rx 2: 2682.8cGy
Assessment: Appropriate reaction
Treatment Margins In Cm: 0.5
Depth (Optional-Please Include Units) Rx 2: 0.7mm
Cumulative Dose In Cgy (Optional): 794.64
Daily Fractionated Dose (Optional- Include Units): 264.88cGy
Patient Positioning: Supine

## 2020-06-05 ENCOUNTER — APPOINTMENT (OUTPATIENT)
Age: 85
Setting detail: DERMATOLOGY
End: 2020-06-09

## 2020-06-05 VITALS — TEMPERATURE: 97.3 F

## 2020-06-05 PROBLEM — C44.321 SQUAMOUS CELL CARCINOMA OF SKIN OF NOSE: Status: ACTIVE | Noted: 2020-06-05

## 2020-06-05 PROCEDURE — 77280 THER RAD SIMULAJ FIELD SMPL: CPT

## 2020-06-05 PROCEDURE — 77401 RADIATION TX DELIVERY SUPFC: CPT

## 2020-06-05 PROCEDURE — OTHER TREATMENT REGIMEN: OTHER

## 2020-06-05 PROCEDURE — OTHER SUPERFICIAL RADIATION TREATMENT: OTHER

## 2020-06-05 PROCEDURE — OTHER FOLLOW UP FOR NEXT VISIT: OTHER

## 2020-06-05 PROCEDURE — G6001 ECHO GUIDANCE RADIOTHERAPY: HCPCS

## 2020-06-05 NOTE — PROCEDURE: TREATMENT REGIMEN
Detail Level: Detailed
Plan: This patient has been treated today with superficial radiation therapy for non-melanoma skin cancer.    \\n\\nWritten informed consent has been previously obtained from this patient for this treatment.  This consent is documented in the patient’s chart.  The patient gave verbal consent to continue treatment today. \\n\\nThe patient was treated with a specific radiation dose and setup as prescribed by the provider listed on this visit note.  A Radiation Therapist performed administration of radiation. The treatment parameters and cumulative dose are indicated above.  \\n\\nPrior to administering the radiation, the patient underwent a verification simulation defining relevant normal and abnormal target anatomy and acquiring images and data necessary to develop optimal radiation treatment process for the patient. This process includes verification of the treatment port(s) and proper treatment positioning.  All treatment ports were photographed.  The patient’s customized lead blocking was confirmed.   Considering, superficial radiotherapy setup is a clinical setup, this requires physician and radiation therapist to clarify location interest being treated against initial images, pathology and patient anatomy. Care was taken ensuring fields treated were geometrically accurate and properly positioned using daily verification simulation.  This process is also utilized to determine if any changes are necessary.   These steps are therefore medically necessary ensuring safe and effective administration of radiation. \\n\\nA high frequency ultrasound image was acquired today for two-dimensional evaluation of the tumor volume and response to treatment, in addition to geometric accuracy of field placement. The daily field placement is separate and distinct from the initial simulation and is an important task in providing safe administration of radiation therapy. Physician evaluation of the ultrasound tumor depth will be ongoing throughout the course of treatment and is deemed medically necessary in order to ensure the efficacy of treatment. Today’s image was evaluated for determination of continuation of treatment with the current plan or with necessary changes as appropriate. Additionally, the use of visualization and targeted assessment allows the patient to be able to see their cancer(s) progress, encouraging patient to complete full course of radiotherapy.  \\n\\nUS depth is 0.87mm, which is +0.1mm in difference from previous imaging. Repop +++\\n\\nPer Dr. Esteban, continued daily US guidance and clinical setup simulation is required for field placement, measurement of tumor depth, progress and acute effect monitoring. \\n\\n

## 2020-06-05 NOTE — PROCEDURE: SUPERFICIAL RADIATION TREATMENT
Detail Level: Detailed
Include Rx 2 When Rendering Additional Prescriptions: No
Time Dose Fractionation (Optional- Include Units If Applicable): 97
Daily Fractionated Dose (Optional- Include Units): 264.88cGy
Energy (Include Units): 70kV
Fractions / Week Rx 4: 5
Custom Shielding Afterword Text Will Not Be Included With Simple Simulations (X X Y Cm............): port to correlate with the lesion size, including treatment margin. The custom lead shield is adequate to accommodate the appropriate applicator and provide adequate shielding around the treatment site. Additional shielding (as noted below) is used to protect sensitive, normal tissues.
Field Size (Applicator): 2.5 cm
Day Of The Week Treatment Administered: Friday
Ssd In Cm (Optional): 15
Number Of Days Off Treatment: 1
Information: Selecting Yes will display possible errors in your note based on the variables you have selected. This validation is only offered as a suggestion for you. PLEASE NOTE THAT THE VALIDATION TEXT WILL BE REMOVED WHEN YOU FINALIZE YOUR NOTE. IF YOU WANT TO FAX A PRELIMINARY NOTE YOU WILL NEED TO TOGGLE THIS TO 'NO' IF YOU DO NOT WANT IT IN YOUR FAXED NOTE.
Shielding Size (Optional- Include Units) Rx 2: 1.5cm x 1.5cm
Field Size (Applicator) Rx 2: 2.0 cm
Fractionation Number: 4
Dose / Tx In Cgy (Optional): 264.88
Computed Treatment Time In Min (Will Render The Same As Calculated Treatment Time If Left Blank): 0.43
Total Number Of Fractions Rx 2: 10
Custom Shielding Preamble Text Will Not Be Included With Simple Simulations (.......... X X Y Cm): A lead shield of 0.762 mm thickness is utilized to form a molded, custom shield with a
Functional Status: 0 (fully active)
Total Number Of Fractions: 20
Treatment Device Design After Initial Simulation Justification (Will Render If Bill For Treatment Devices = Yes): The patient is status post radiation simulation and is evaluated as to the use of additional devices for shielding and placement for radiation therapy.
Shielding Size (Optional- Include Units): 2.0cm x 2.0cm
Initial Radiation Treatment Planning (Will Render If Bill Simulation = Yes): The patient had a complete consultation regarding all applicable modalities for the treatment of their skin cancer and based on a variety of factors including the type of tumor, size, and location, the relevant medical history as well as local tissue factors, the functional status of the individual, the ability to perform necessary postoperative wound instructions and the need for simultaneous treatments as well as overall wound healing status, it was determined that the patient would begin radiation therapy treatment for skin cancer.  A full simulation and treatment device design was performed including the determination and formulation of appropriate simple and complex devices including lead shield of 0.762 mm thickness to form molded customized shielding to specifically correlate with the lesion size including treatment margin.  The custom lead shield is adequate to accommodate the appropriate applicator and provide adequate shielding around the treatment site.  The specific field applicator, shields, and devices both simple and complex as well as the specific patient setup is outlined below.  The patient was given a full consent for superficial radiation to both verbally and in writing and the full determination of patient's eligibility for treatment and selection is outlined on the patient eligibility and treatment selection form.  The specific superficial radiotherapy prescription was determined and was documented on the superficial radiotherapy prescription form.  A treatment calculation was also performed and documented on the treatment calculation form.  Based on the prescription, the patient was scheduled for a series of fractional treatments.
Bill For Radiation Treatment: Yes
Intro Statement (Will Not Render If Left Blank): The patient is undergoing superficial radiation therapy for skin cancer and presents for weekly evaluation and management.  Per protocol and as documented on the flow sheet, the patient was questioned as to subjective redness, pruritus, pain, drainage, fatigue, or any other symptoms.  Objectively, the radiation area was evaluated with regards to erythema, atrophy, scale, crusting, erosion, ulceration, edema, purpura, tenderness, warmth, drainage, and any other findings.  The plan was extensively reviewed including the dose, and dosing schedule.  The simulation and clinical setup was also reviewed as was the external and any internal shields and based on this review the appropriateness and sufficiency of treatment was determined.
Port Dimensions-X Axis In Cm: 2
Total Dose (Optional-Please Include Units) Rx 2: 2682.8cGy
Daily Fractionated Dose (Optional- Include Units) Rx 2: 268.28cGy
Fractions / Week Rx 2: 3
Treatment Time / Fractionation (Optional- Include Units): 0.43min
Depth (Optional-Please Include Units): 0.8mm
Cumulative Dose In Cgy (Optional): 1059.52
Time Dose Fractionation (Optional- Include Units If Applicable) Rx 2: 47
Total Dose (Optional-Please Include Units): 5297.6cGy
Treatment Margins In Cm: 0.5
Patient Positioning: Supine
Assessment: Appropriate reaction
Energy (Optional-Please Include Units) Rx 2: 50kV
Treatment Time / Fractionation (Optional- Include Units) Rx 2: 0.38min
Simple Simulation Preamble Text Will Be Included With Simple Simulations (.......... Indications): Simple simulation was performed today for the following reasons:
Depth (Optional-Please Include Units) Rx 2: 0.7mm

## 2020-06-08 ENCOUNTER — APPOINTMENT (OUTPATIENT)
Age: 85
Setting detail: DERMATOLOGY
End: 2020-06-09

## 2020-06-08 VITALS — TEMPERATURE: 97.5 F

## 2020-06-08 PROBLEM — C44.321 SQUAMOUS CELL CARCINOMA OF SKIN OF NOSE: Status: ACTIVE | Noted: 2020-06-08

## 2020-06-08 PROCEDURE — 77280 THER RAD SIMULAJ FIELD SMPL: CPT

## 2020-06-08 PROCEDURE — OTHER FOLLOW UP FOR NEXT VISIT: OTHER

## 2020-06-08 PROCEDURE — OTHER TREATMENT REGIMEN: OTHER

## 2020-06-08 PROCEDURE — 77427 RADIATION TX MANAGEMENT X5: CPT

## 2020-06-08 PROCEDURE — G6001 ECHO GUIDANCE RADIOTHERAPY: HCPCS

## 2020-06-08 PROCEDURE — OTHER SUPERFICIAL RADIATION TREATMENT: OTHER

## 2020-06-08 PROCEDURE — 77401 RADIATION TX DELIVERY SUPFC: CPT

## 2020-06-08 NOTE — PROCEDURE: TREATMENT REGIMEN
Plan: This patient has been treated today with superficial radiation therapy for non-melanoma skin cancer.    \\n\\nWritten informed consent has been previously obtained from this patient for this treatment.  This consent is documented in the patient’s chart.  The patient gave verbal consent to continue treatment today. \\n\\nThe patient was treated with a specific radiation dose and setup as prescribed by the provider listed on this visit note.  A Radiation Therapist performed administration of radiation. The treatment parameters and cumulative dose are indicated above.  \\n\\nPrior to administering the radiation, the patient underwent a verification simulation defining relevant normal and abnormal target anatomy and acquiring images and data necessary to develop optimal radiation treatment process for the patient. This process includes verification of the treatment port(s) and proper treatment positioning.  All treatment ports were photographed.  The patient’s customized lead blocking was confirmed.   Considering, superficial radiotherapy setup is a clinical setup, this requires physician and radiation therapist to clarify location interest being treated against initial images, pathology and patient anatomy. Care was taken ensuring fields treated were geometrically accurate and properly positioned using daily verification simulation.  This process is also utilized to determine if any changes are necessary.   These steps are therefore medically necessary ensuring safe and effective administration of radiation. \\n\\nA high frequency ultrasound image was acquired today for two-dimensional evaluation of the tumor volume and response to treatment, in addition to geometric accuracy of field placement. The daily field placement is separate and distinct from the initial simulation and is an important task in providing safe administration of radiation therapy. Physician evaluation of the ultrasound tumor depth will be ongoing throughout the course of treatment and is deemed medically necessary in order to ensure the efficacy of treatment. Today’s image was evaluated for determination of continuation of treatment with the current plan or with necessary changes as appropriate. Additionally, the use of visualization and targeted assessment allows the patient to be able to see their cancer(s) progress, encouraging patient to complete full course of radiotherapy.  \\n\\nUS depth is 0.95mm, which is 0.08mm in difference from previous imaging. Repop +++\\n\\nPer Dr. Esteban, continued daily US guidance and clinical setup simulation is required for field placement, measurement of tumor depth, progress and acute effect monitoring. \\n\\n
Detail Level: Detailed

## 2020-06-09 ENCOUNTER — APPOINTMENT (OUTPATIENT)
Age: 85
Setting detail: DERMATOLOGY
End: 2020-06-09

## 2020-06-09 VITALS — TEMPERATURE: 97.7 F

## 2020-06-09 PROBLEM — C44.321 SQUAMOUS CELL CARCINOMA OF SKIN OF NOSE: Status: ACTIVE | Noted: 2020-06-09

## 2020-06-09 PROCEDURE — 77280 THER RAD SIMULAJ FIELD SMPL: CPT

## 2020-06-09 PROCEDURE — G6001 ECHO GUIDANCE RADIOTHERAPY: HCPCS

## 2020-06-09 PROCEDURE — 77401 RADIATION TX DELIVERY SUPFC: CPT

## 2020-06-09 PROCEDURE — OTHER SUPERFICIAL RADIATION TREATMENT: OTHER

## 2020-06-09 PROCEDURE — OTHER TREATMENT REGIMEN: OTHER

## 2020-06-09 PROCEDURE — OTHER FOLLOW UP FOR NEXT VISIT: OTHER

## 2020-06-09 NOTE — PROCEDURE: SUPERFICIAL RADIATION TREATMENT
Assessment: Appropriate reaction
Time Dose Fractionation (Optional- Include Units If Applicable) Rx 2: 47
Validate Note Data (See Information Below): No
Computed Treatment Time In Min (Will Render The Same As Calculated Treatment Time If Left Blank): 0.43
Day Of The Week Treatment Administered: Tuesday
Total Number Of Fractions: 20
Custom Shielding Preamble Text Will Not Be Included With Simple Simulations (.......... X X Y Cm): A lead shield of 0.762 mm thickness is utilized to form a molded, custom shield with a
Depth (Optional-Please Include Units) Rx 2: 0.7mm
Information: Selecting Yes will display possible errors in your note based on the variables you have selected. This validation is only offered as a suggestion for you. PLEASE NOTE THAT THE VALIDATION TEXT WILL BE REMOVED WHEN YOU FINALIZE YOUR NOTE. IF YOU WANT TO FAX A PRELIMINARY NOTE YOU WILL NEED TO TOGGLE THIS TO 'NO' IF YOU DO NOT WANT IT IN YOUR FAXED NOTE.
Dimensions-X Axis In Cm: 1
Energy (Optional-Please Include Units): 70kV
Shielding Size (Optional- Include Units): 2.0cm x 2.0cm
Fractionation Number: 6
Time Dose Fractionation (Optional- Include Units If Applicable): 97
Total Number Of Fractions Rx 4: 15
Fractionation Number (Evaluation): 5
Total Dose (Optional-Please Include Units) Rx 2: 2682.8cGy
Daily Fractionated Dose (Optional- Include Units) Rx 2: 268.28cGy
Shielding Size (Optional- Include Units) Rx 2: 1.5cm x 1.5cm
Field Size (Applicator): 2.5 cm
Dose / Tx In Cgy (Optional): 264.88
Treatment Margins In Cm: 0.5
Detail Level: Detailed
Total Number Of Fractions Rx 2: 10
Functional Status: 0 (fully active)
Treatment Time / Fractionation (Optional- Include Units): 0.43min
Patient Positioning: Supine
Custom Shielding Afterword Text Will Not Be Included With Simple Simulations (X X Y Cm............): port to correlate with the lesion size, including treatment margin. The custom lead shield is adequate to accommodate the appropriate applicator and provide adequate shielding around the treatment site. Additional shielding (as noted below) is used to protect sensitive, normal tissues.
Treatment Device Design After Initial Simulation Justification (Will Render If Bill For Treatment Devices = Yes): The patient is status post radiation simulation and is evaluated as to the use of additional devices for shielding and placement for radiation therapy.
Treatment Time / Fractionation (Optional- Include Units) Rx 2: 0.38min
Energy (Optional-Please Include Units) Rx 2: 50kV
Field Size (Applicator) Rx 2: 2.0 cm
Simple Simulation Afterword Text Will Be Included With Simple Simulations (Indications............): The patient had a complete consultation regarding all applicable modalities for the treatment of their skin cancer and based on a variety of factors including the type of tumor, size, and location, the relevant medical history as well as local tissue factors, the functional status of the individual, the ability to perform necessary postoperative wound instructions and the need for simultaneous treatments as well as overall wound healing status, it was determined that the patient would begin radiation therapy treatment for skin cancer.  A full simulation and treatment device design was performed including the determination and formulation of appropriate simple and complex devices including lead shield of 0.762 mm thickness to form molded customized shielding to specifically correlate with the lesion size including treatment margin.  The custom lead shield is adequate to accommodate the appropriate applicator and provide adequate shielding around the treatment site.  The specific field applicator, shields, and devices both simple and complex as well as the specific patient setup is outlined below.  The patient was given a full consent for superficial radiation to both verbally and in writing and the full determination of patient's eligibility for treatment and selection is outlined on the patient eligibility and treatment selection form.  The specific superficial radiotherapy prescription was determined and was documented on the superficial radiotherapy prescription form.  A treatment calculation was also performed and documented on the treatment calculation form.  Based on the prescription, the patient was scheduled for a series of fractional treatments.
Cumulative Dose In Cgy (Optional): 1589.28
Total Dose (Optional-Please Include Units): 5297.6cGy
Depth (Optional-Please Include Units): 0.8mm
Port Dimensions-X Axis In Cm: 2
Simple Simulation Preamble Text Will Be Included With Simple Simulations (.......... Indications): Simple simulation was performed today for the following reasons:
Fractions / Week Rx 2: 3
Intro Statement (Will Not Render If Left Blank): The patient is undergoing superficial radiation therapy for skin cancer and presents for weekly evaluation and management.  Per protocol and as documented on the flow sheet, the patient was questioned as to subjective redness, pruritus, pain, drainage, fatigue, or any other symptoms.  Objectively, the radiation area was evaluated with regards to erythema, atrophy, scale, crusting, erosion, ulceration, edema, purpura, tenderness, warmth, drainage, and any other findings.  The plan was extensively reviewed including the dose, and dosing schedule.  The simulation and clinical setup was also reviewed as was the external and any internal shields and based on this review the appropriateness and sufficiency of treatment was determined.
Daily Fractionated Dose (Optional- Include Units): 264.88cGy
Fractions / Week: 4
Comments: On Target, RTOG 1
Bill For Radiation Treatment: Yes

## 2020-06-09 NOTE — PROCEDURE: TREATMENT REGIMEN
Detail Level: Detailed
Plan: This patient has been treated today with superficial radiation therapy for non-melanoma skin cancer.    \\n\\nWritten informed consent has been previously obtained from this patient for this treatment.  This consent is documented in the patient’s chart.  The patient gave verbal consent to continue treatment today. \\n\\nThe patient was treated with a specific radiation dose and setup as prescribed by the provider listed on this visit note.  A Radiation Therapist performed administration of radiation. The treatment parameters and cumulative dose are indicated above.  \\n\\nPrior to administering the radiation, the patient underwent a verification simulation defining relevant normal and abnormal target anatomy and acquiring images and data necessary to develop optimal radiation treatment process for the patient. This process includes verification of the treatment port(s) and proper treatment positioning.  All treatment ports were photographed.  The patient’s customized lead blocking was confirmed.   Considering, superficial radiotherapy setup is a clinical setup, this requires physician and radiation therapist to clarify location interest being treated against initial images, pathology and patient anatomy. Care was taken ensuring fields treated were geometrically accurate and properly positioned using daily verification simulation.  This process is also utilized to determine if any changes are necessary.   These steps are therefore medically necessary ensuring safe and effective administration of radiation. \\n\\nA high frequency ultrasound image was acquired today for two-dimensional evaluation of the tumor volume and response to treatment, in addition to geometric accuracy of field placement. The daily field placement is separate and distinct from the initial simulation and is an important task in providing safe administration of radiation therapy. Physician evaluation of the ultrasound tumor depth will be ongoing throughout the course of treatment and is deemed medically necessary in order to ensure the efficacy of treatment. Today’s image was evaluated for determination of continuation of treatment with the current plan or with necessary changes as appropriate. Additionally, the use of visualization and targeted assessment allows the patient to be able to see their cancer(s) progress, encouraging patient to complete full course of radiotherapy.  \\n\\nUS depth is 0.79mm, which is -0.16mm in difference from previous imaging. Repop +++\\n\\nPer Dr. Esteban, continued daily US guidance and clinical setup simulation is required for field placement, measurement of tumor depth, progress and acute effect monitoring. \\n\\n

## 2020-06-11 ENCOUNTER — APPOINTMENT (OUTPATIENT)
Age: 85
Setting detail: DERMATOLOGY
End: 2020-06-16

## 2020-06-11 VITALS — TEMPERATURE: 97.5 F

## 2020-06-11 PROBLEM — C44.321 SQUAMOUS CELL CARCINOMA OF SKIN OF NOSE: Status: ACTIVE | Noted: 2020-06-11

## 2020-06-11 PROCEDURE — G6001 ECHO GUIDANCE RADIOTHERAPY: HCPCS

## 2020-06-11 PROCEDURE — 77401 RADIATION TX DELIVERY SUPFC: CPT

## 2020-06-11 PROCEDURE — OTHER FOLLOW UP FOR NEXT VISIT: OTHER

## 2020-06-11 PROCEDURE — OTHER TREATMENT REGIMEN: OTHER

## 2020-06-11 PROCEDURE — OTHER SUPERFICIAL RADIATION TREATMENT: OTHER

## 2020-06-11 PROCEDURE — 77280 THER RAD SIMULAJ FIELD SMPL: CPT

## 2020-06-11 NOTE — PROCEDURE: SUPERFICIAL RADIATION TREATMENT
Field Size (Applicator) Rx 2: 2.0 cm
Custom Shielding Afterword Text Will Not Be Included With Simple Simulations (X X Y Cm............): port to correlate with the lesion size, including treatment margin. The custom lead shield is adequate to accommodate the appropriate applicator and provide adequate shielding around the treatment site. Additional shielding (as noted below) is used to protect sensitive, normal tissues.
Render Text From Evaluation And Management Tab (Will Not Bill 66206): No
Field Size (Applicator): 2.5 cm
Daily Fractionated Dose (Optional- Include Units) Rx 2: 268.28cGy
Total Number Of Fractions Rx 3: 15
Functional Status: 0 (fully active)
Treatment Margins In Cm: 0.5
Number Of Days Off Treatment: 1
Treatment Time / Fractionation (Optional- Include Units): 0.43min
Dose Per Fractionation In Cgy (Optional): 264.88
Patient Positioning: Supine
Energy (Optional-Please Include Units) Rx 2: 50kV
Shielding Size (Optional- Include Units) Rx 2: 1.5cm x 1.5cm
Depth (Optional-Please Include Units): 0.8mm
Treatment Device Design After Initial Simulation Justification (Will Render If Bill For Treatment Devices = Yes): The patient is status post radiation simulation and is evaluated as to the use of additional devices for shielding and placement for radiation therapy.
Simple Simulation Preamble Text Will Be Included With Simple Simulations (.......... Indications): Simple simulation was performed today for the following reasons:
Port Dimensions-X Axis In Cm: 2
Cumulative Dose In Cgy (Optional): 1854.16
Treatment Time / Fractionation (Optional- Include Units) Rx 2: 0.38min
Initial Radiation Treatment Planning (Will Render If Bill Simulation = Yes): The patient had a complete consultation regarding all applicable modalities for the treatment of their skin cancer and based on a variety of factors including the type of tumor, size, and location, the relevant medical history as well as local tissue factors, the functional status of the individual, the ability to perform necessary postoperative wound instructions and the need for simultaneous treatments as well as overall wound healing status, it was determined that the patient would begin radiation therapy treatment for skin cancer.  A full simulation and treatment device design was performed including the determination and formulation of appropriate simple and complex devices including lead shield of 0.762 mm thickness to form molded customized shielding to specifically correlate with the lesion size including treatment margin.  The custom lead shield is adequate to accommodate the appropriate applicator and provide adequate shielding around the treatment site.  The specific field applicator, shields, and devices both simple and complex as well as the specific patient setup is outlined below.  The patient was given a full consent for superficial radiation to both verbally and in writing and the full determination of patient's eligibility for treatment and selection is outlined on the patient eligibility and treatment selection form.  The specific superficial radiotherapy prescription was determined and was documented on the superficial radiotherapy prescription form.  A treatment calculation was also performed and documented on the treatment calculation form.  Based on the prescription, the patient was scheduled for a series of fractional treatments.
Total Dose (Optional-Please Include Units): 5297.6cGy
Fractions / Week Rx 2: 3
Bill For Radiation Treatment: Yes
Total Number Of Fractions Rx 2: 10
Comments: On Target, RTOG 1
Daily Fractionated Dose (Optional- Include Units): 264.88cGy
Fractions / Week Rx 4: 5
Total Number Of Fractions: 20
Intro Statement (Will Not Render If Left Blank): The patient is undergoing superficial radiation therapy for skin cancer and presents for weekly evaluation and management.  Per protocol and as documented on the flow sheet, the patient was questioned as to subjective redness, pruritus, pain, drainage, fatigue, or any other symptoms.  Objectively, the radiation area was evaluated with regards to erythema, atrophy, scale, crusting, erosion, ulceration, edema, purpura, tenderness, warmth, drainage, and any other findings.  The plan was extensively reviewed including the dose, and dosing schedule.  The simulation and clinical setup was also reviewed as was the external and any internal shields and based on this review the appropriateness and sufficiency of treatment was determined.
Fractions / Week: 4
Day Of The Week Treatment Administered: Thursday
Computed Treatment Time In Min (Will Render The Same As Calculated Treatment Time If Left Blank): 0.43
Assessment: Appropriate reaction
Custom Shielding Preamble Text Will Not Be Included With Simple Simulations (.......... X X Y Cm): A lead shield of 0.762 mm thickness is utilized to form a molded, custom shield with a
Depth (Optional-Please Include Units) Rx 2: 0.7mm
Information: Selecting Yes will display possible errors in your note based on the variables you have selected. This validation is only offered as a suggestion for you. PLEASE NOTE THAT THE VALIDATION TEXT WILL BE REMOVED WHEN YOU FINALIZE YOUR NOTE. IF YOU WANT TO FAX A PRELIMINARY NOTE YOU WILL NEED TO TOGGLE THIS TO 'NO' IF YOU DO NOT WANT IT IN YOUR FAXED NOTE.
Energy (Include Units): 70kV
Time Dose Fractionation (Optional- Include Units If Applicable) Rx 2: 47
Shielding Size (Optional- Include Units): 2.0cm x 2.0cm
Fractionation Number: 7
Total Dose (Optional-Please Include Units) Rx 2: 2682.8cGy
Detail Level: Detailed
Time Dose Fractionation (Optional- Include Units If Applicable): 97

## 2020-06-12 ENCOUNTER — APPOINTMENT (OUTPATIENT)
Age: 85
Setting detail: DERMATOLOGY
End: 2020-06-16

## 2020-06-12 VITALS — TEMPERATURE: 97.5 F

## 2020-06-12 PROBLEM — Z85.828 PERSONAL HISTORY OF OTHER MALIGNANT NEOPLASM OF SKIN: Status: ACTIVE | Noted: 2020-06-12

## 2020-06-12 PROBLEM — C44.321 SQUAMOUS CELL CARCINOMA OF SKIN OF NOSE: Status: ACTIVE | Noted: 2020-06-12

## 2020-06-12 PROCEDURE — OTHER TREATMENT REGIMEN: OTHER

## 2020-06-12 PROCEDURE — 77280 THER RAD SIMULAJ FIELD SMPL: CPT

## 2020-06-12 PROCEDURE — 77401 RADIATION TX DELIVERY SUPFC: CPT

## 2020-06-12 PROCEDURE — OTHER SUPERFICIAL RADIATION TREATMENT: OTHER

## 2020-06-12 PROCEDURE — G6001 ECHO GUIDANCE RADIOTHERAPY: HCPCS

## 2020-06-12 PROCEDURE — OTHER FOLLOW UP FOR NEXT VISIT: OTHER

## 2020-06-12 NOTE — PROCEDURE: SUPERFICIAL RADIATION TREATMENT
Include Rx 3 When Rendering Additional Prescriptions: No
Total Number Of Fractions Rx 3: 15
Custom Shielding Preamble Text Will Not Be Included With Simple Simulations (.......... X X Y Cm): A lead shield of 0.762 mm thickness is utilized to form a molded, custom shield with a
Energy (Optional-Please Include Units): 70kV
Daily Fractionated Dose (Optional- Include Units) Rx 2: 268.28cGy
Field Size (Applicator): 2.5 cm
Functional Status: 0 (fully active)
Treatment Time / Fractionation (Optional- Include Units): 0.43min
Shielding Size (Optional- Include Units): 2.0cm x 2.0cm
Detail Level: Detailed
Custom Shielding Afterword Text Will Not Be Included With Simple Simulations (X X Y Cm............): port to correlate with the lesion size, including treatment margin. The custom lead shield is adequate to accommodate the appropriate applicator and provide adequate shielding around the treatment site. Additional shielding (as noted below) is used to protect sensitive, normal tissues.
Treatment Device Design After Initial Simulation Justification (Will Render If Bill For Treatment Devices = Yes): The patient is status post radiation simulation and is evaluated as to the use of additional devices for shielding and placement for radiation therapy.
Time Dose Fractionation (Optional- Include Units If Applicable): 97
Shielding Size (Optional- Include Units) Rx 2: 1.5cm x 1.5cm
Initial Radiation Treatment Planning (Will Render If Bill Simulation = Yes): The patient had a complete consultation regarding all applicable modalities for the treatment of their skin cancer and based on a variety of factors including the type of tumor, size, and location, the relevant medical history as well as local tissue factors, the functional status of the individual, the ability to perform necessary postoperative wound instructions and the need for simultaneous treatments as well as overall wound healing status, it was determined that the patient would begin radiation therapy treatment for skin cancer.  A full simulation and treatment device design was performed including the determination and formulation of appropriate simple and complex devices including lead shield of 0.762 mm thickness to form molded customized shielding to specifically correlate with the lesion size including treatment margin.  The custom lead shield is adequate to accommodate the appropriate applicator and provide adequate shielding around the treatment site.  The specific field applicator, shields, and devices both simple and complex as well as the specific patient setup is outlined below.  The patient was given a full consent for superficial radiation to both verbally and in writing and the full determination of patient's eligibility for treatment and selection is outlined on the patient eligibility and treatment selection form.  The specific superficial radiotherapy prescription was determined and was documented on the superficial radiotherapy prescription form.  A treatment calculation was also performed and documented on the treatment calculation form.  Based on the prescription, the patient was scheduled for a series of fractional treatments.
Total Dose (Optional-Please Include Units) Rx 2: 2682.8cGy
Field Size (Applicator) Rx 2: 2.0 cm
Depth (Optional-Please Include Units): 0.8mm
Total Dose (Optional-Please Include Units): 5297.6cGy
Port Dimensions-X Axis In Cm: 2
Treatment Margins In Cm: 0.5
Fractions / Week Rx 2: 3
Number Of Days Off Treatment: 1
Total Number Of Fractions Rx 2: 10
Dose Per Fractionation In Cgy (Optional): 264.88
Cumulative Dose In Cgy (Optional): 2119.04
Patient Positioning: Supine
Bill For Radiation Treatment: Yes
Daily Fractionated Dose (Optional- Include Units): 264.88cGy
Fractions / Week Rx 4: 5
Treatment Time / Fractionation (Optional- Include Units) Rx 2: 0.38min
Energy (Optional-Please Include Units) Rx 2: 50kV
Day Of The Week Treatment Administered: Friday
Assessment: Appropriate reaction
Total Number Of Fractions: 20
Depth (Optional-Please Include Units) Rx 2: 0.7mm
Simple Simulation Preamble Text Will Be Included With Simple Simulations (.......... Indications): Simple simulation was performed today for the following reasons:
Intro Statement (Will Not Render If Left Blank): The patient is undergoing superficial radiation therapy for skin cancer and presents for weekly evaluation and management.  Per protocol and as documented on the flow sheet, the patient was questioned as to subjective redness, pruritus, pain, drainage, fatigue, or any other symptoms.  Objectively, the radiation area was evaluated with regards to erythema, atrophy, scale, crusting, erosion, ulceration, edema, purpura, tenderness, warmth, drainage, and any other findings.  The plan was extensively reviewed including the dose, and dosing schedule.  The simulation and clinical setup was also reviewed as was the external and any internal shields and based on this review the appropriateness and sufficiency of treatment was determined.
Time Dose Fractionation (Optional- Include Units If Applicable) Rx 2: 47
Fractions / Week: 4
Comments: On Target, RTOG 1
Fractionation Number: 8
Computed Treatment Time In Min (Will Render The Same As Calculated Treatment Time If Left Blank): 0.43
Information: Selecting Yes will display possible errors in your note based on the variables you have selected. This validation is only offered as a suggestion for you. PLEASE NOTE THAT THE VALIDATION TEXT WILL BE REMOVED WHEN YOU FINALIZE YOUR NOTE. IF YOU WANT TO FAX A PRELIMINARY NOTE YOU WILL NEED TO TOGGLE THIS TO 'NO' IF YOU DO NOT WANT IT IN YOUR FAXED NOTE.

## 2020-06-12 NOTE — PROCEDURE: TREATMENT REGIMEN
Plan: This patient has been treated today with superficial radiation therapy for non-melanoma skin cancer.    \\n\\nWritten informed consent has been previously obtained from this patient for this treatment.  This consent is documented in the patient’s chart.  The patient gave verbal consent to continue treatment today. \\n\\nThe patient was treated with a specific radiation dose and setup as prescribed by the provider listed on this visit note.  A Radiation Therapist performed administration of radiation. The treatment parameters and cumulative dose are indicated above.  \\n\\nPrior to administering the radiation, the patient underwent a verification simulation defining relevant normal and abnormal target anatomy and acquiring images and data necessary to develop optimal radiation treatment process for the patient. This process includes verification of the treatment port(s) and proper treatment positioning.  All treatment ports were photographed.  The patient’s customized lead blocking was confirmed.   Considering, superficial radiotherapy setup is a clinical setup, this requires physician and radiation therapist to clarify location interest being treated against initial images, pathology and patient anatomy. Care was taken ensuring fields treated were geometrically accurate and properly positioned using daily verification simulation.  This process is also utilized to determine if any changes are necessary.   These steps are therefore medically necessary ensuring safe and effective administration of radiation. \\n\\nA high frequency ultrasound image was acquired today for two-dimensional evaluation of the tumor volume and response to treatment, in addition to geometric accuracy of field placement. The daily field placement is separate and distinct from the initial simulation and is an important task in providing safe administration of radiation therapy. Physician evaluation of the ultrasound tumor depth will be ongoing throughout the course of treatment and is deemed medically necessary in order to ensure the efficacy of treatment. Today’s image was evaluated for determination of continuation of treatment with the current plan or with necessary changes as appropriate. Additionally, the use of visualization and targeted assessment allows the patient to be able to see their cancer(s) progress, encouraging patient to complete full course of radiotherapy.  \\n\\nUS depth is 0.0mm, which is 0mm in difference from previous imaging. Repop +++\\n\\nPer Dr. Esteban, continued daily US guidance and clinical setup simulation is required for field placement, measurement of tumor depth, progress and acute effect monitoring. \\n\\n
Detail Level: Detailed

## 2020-06-15 ENCOUNTER — APPOINTMENT (OUTPATIENT)
Age: 85
Setting detail: DERMATOLOGY
End: 2020-06-16

## 2020-06-15 VITALS — TEMPERATURE: 97.7 F

## 2020-06-15 PROBLEM — C44.321 SQUAMOUS CELL CARCINOMA OF SKIN OF NOSE: Status: ACTIVE | Noted: 2020-06-15

## 2020-06-15 PROCEDURE — G6001 ECHO GUIDANCE RADIOTHERAPY: HCPCS

## 2020-06-15 PROCEDURE — OTHER SUPERFICIAL RADIATION TREATMENT: OTHER

## 2020-06-15 PROCEDURE — 77280 THER RAD SIMULAJ FIELD SMPL: CPT

## 2020-06-15 PROCEDURE — 77401 RADIATION TX DELIVERY SUPFC: CPT

## 2020-06-15 PROCEDURE — OTHER FOLLOW UP FOR NEXT VISIT: OTHER

## 2020-06-15 PROCEDURE — OTHER TREATMENT REGIMEN: OTHER

## 2020-06-15 NOTE — PROCEDURE: SUPERFICIAL RADIATION TREATMENT
Simple Simulation Preamble Text Will Be Included With Simple Simulations (.......... Indications): Simple simulation was performed today for the following reasons:
Port Dimensions-X Axis In Cm: 2
Bill For Simulation And Treatment Device Design: No
Initial Radiation Treatment Planning (Will Render If Bill Simulation = Yes): The patient had a complete consultation regarding all applicable modalities for the treatment of their skin cancer and based on a variety of factors including the type of tumor, size, and location, the relevant medical history as well as local tissue factors, the functional status of the individual, the ability to perform necessary postoperative wound instructions and the need for simultaneous treatments as well as overall wound healing status, it was determined that the patient would begin radiation therapy treatment for skin cancer.  A full simulation and treatment device design was performed including the determination and formulation of appropriate simple and complex devices including lead shield of 0.762 mm thickness to form molded customized shielding to specifically correlate with the lesion size including treatment margin.  The custom lead shield is adequate to accommodate the appropriate applicator and provide adequate shielding around the treatment site.  The specific field applicator, shields, and devices both simple and complex as well as the specific patient setup is outlined below.  The patient was given a full consent for superficial radiation to both verbally and in writing and the full determination of patient's eligibility for treatment and selection is outlined on the patient eligibility and treatment selection form.  The specific superficial radiotherapy prescription was determined and was documented on the superficial radiotherapy prescription form.  A treatment calculation was also performed and documented on the treatment calculation form.  Based on the prescription, the patient was scheduled for a series of fractional treatments.
Prescription Used: 1
Daily Fractionated Dose (Optional- Include Units): 264.88cGy
Intro Statement (Will Not Render If Left Blank): The patient is undergoing superficial radiation therapy for skin cancer and presents for weekly evaluation and management.  Per protocol and as documented on the flow sheet, the patient was questioned as to subjective redness, pruritus, pain, drainage, fatigue, or any other symptoms.  Objectively, the radiation area was evaluated with regards to erythema, atrophy, scale, crusting, erosion, ulceration, edema, purpura, tenderness, warmth, drainage, and any other findings.  The plan was extensively reviewed including the dose, and dosing schedule.  The simulation and clinical setup was also reviewed as was the external and any internal shields and based on this review the appropriateness and sufficiency of treatment was determined.
Fractions / Week Rx 2: 3
Comments: On Target, RTOG 1
Fractions / Week Rx 4: 5
Fractions / Week: 4
Computed Treatment Time In Min (Will Render The Same As Calculated Treatment Time If Left Blank): 0.43
Total Number Of Fractions: 20
Information: Selecting Yes will display possible errors in your note based on the variables you have selected. This validation is only offered as a suggestion for you. PLEASE NOTE THAT THE VALIDATION TEXT WILL BE REMOVED WHEN YOU FINALIZE YOUR NOTE. IF YOU WANT TO FAX A PRELIMINARY NOTE YOU WILL NEED TO TOGGLE THIS TO 'NO' IF YOU DO NOT WANT IT IN YOUR FAXED NOTE.
Time Dose Fractionation (Optional- Include Units If Applicable) Rx 2: 47
Bill For Radiation Treatment: Yes
Day Of The Week Treatment Administered: Monday
Assessment: Appropriate reaction
Custom Shielding Preamble Text Will Not Be Included With Simple Simulations (.......... X X Y Cm): A lead shield of 0.762 mm thickness is utilized to form a molded, custom shield with a
Field Size (Applicator): 2.5 cm
Energy (Optional-Please Include Units): 70kV
Depth (Optional-Please Include Units) Rx 2: 0.7mm
Fractionation Number: 9
Detail Level: Detailed
Time Dose Fractionation (Optional- Include Units If Applicable): 97
Total Number Of Fractions Rx 4: 15
Shielding Size (Optional- Include Units): 2.0cm x 2.0cm
Total Dose (Optional-Please Include Units) Rx 2: 2682.8cGy
Daily Fractionated Dose (Optional- Include Units) Rx 2: 268.28cGy
Treatment Time / Fractionation (Optional- Include Units): 0.43min
Shielding Size (Optional- Include Units) Rx 2: 1.5cm x 1.5cm
Custom Shielding Afterword Text Will Not Be Included With Simple Simulations (X X Y Cm............): port to correlate with the lesion size, including treatment margin. The custom lead shield is adequate to accommodate the appropriate applicator and provide adequate shielding around the treatment site. Additional shielding (as noted below) is used to protect sensitive, normal tissues.
Depth (Optional-Please Include Units): 0.8mm
Dose / Tx In Cgy (Optional): 264.88
Cumulative Dose In Cgy (Optional): 2383.92
Total Number Of Fractions Rx 2: 10
Functional Status: 0 (fully active)
Total Dose (Optional-Please Include Units): 5297.6cGy
Patient Positioning: Supine
Treatment Margins In Cm: 0.5
Energy (Optional-Please Include Units) Rx 2: 50kV
Field Size (Applicator) Rx 2: 2.0 cm
Treatment Time / Fractionation (Optional- Include Units) Rx 2: 0.38min
Treatment Device Design After Initial Simulation Justification (Will Render If Bill For Treatment Devices = Yes): The patient is status post radiation simulation and is evaluated as to the use of additional devices for shielding and placement for radiation therapy.

## 2020-06-16 ENCOUNTER — APPOINTMENT (OUTPATIENT)
Age: 85
Setting detail: DERMATOLOGY
End: 2020-06-24

## 2020-06-16 ENCOUNTER — APPOINTMENT (OUTPATIENT)
Age: 85
Setting detail: DERMATOLOGY
End: 2020-06-23

## 2020-06-16 VITALS — TEMPERATURE: 97.5 F

## 2020-06-16 DIAGNOSIS — L57.0 ACTINIC KERATOSIS: ICD-10-CM

## 2020-06-16 PROBLEM — C44.321 SQUAMOUS CELL CARCINOMA OF SKIN OF NOSE: Status: ACTIVE | Noted: 2020-06-16

## 2020-06-16 PROCEDURE — OTHER TREATMENT REGIMEN: OTHER

## 2020-06-16 PROCEDURE — OTHER FOLLOW UP FOR NEXT VISIT: OTHER

## 2020-06-16 PROCEDURE — OTHER LIQUID NITROGEN: OTHER

## 2020-06-16 PROCEDURE — 77427 RADIATION TX MANAGEMENT X5: CPT

## 2020-06-16 PROCEDURE — OTHER COUNSELING: OTHER

## 2020-06-16 PROCEDURE — G6001 ECHO GUIDANCE RADIOTHERAPY: HCPCS

## 2020-06-16 PROCEDURE — OTHER MIPS QUALITY: OTHER

## 2020-06-16 PROCEDURE — 17003 DESTRUCT PREMALG LES 2-14: CPT

## 2020-06-16 PROCEDURE — 77401 RADIATION TX DELIVERY SUPFC: CPT

## 2020-06-16 PROCEDURE — OTHER SUPERFICIAL RADIATION TREATMENT: OTHER

## 2020-06-16 PROCEDURE — 77280 THER RAD SIMULAJ FIELD SMPL: CPT

## 2020-06-16 PROCEDURE — 17000 DESTRUCT PREMALG LESION: CPT

## 2020-06-16 ASSESSMENT — LOCATION SIMPLE DESCRIPTION DERM
LOCATION SIMPLE: LEFT ZYGOMA
LOCATION SIMPLE: LEFT TEMPLE
LOCATION SIMPLE: NOSE

## 2020-06-16 ASSESSMENT — LOCATION DETAILED DESCRIPTION DERM
LOCATION DETAILED: LEFT CENTRAL ZYGOMA
LOCATION DETAILED: NASAL DORSUM
LOCATION DETAILED: LEFT CENTRAL TEMPLE

## 2020-06-16 ASSESSMENT — LOCATION ZONE DERM
LOCATION ZONE: FACE
LOCATION ZONE: NOSE

## 2020-06-16 NOTE — PROCEDURE: TREATMENT REGIMEN
Detail Level: Detailed
Plan: This patient has been treated today with superficial radiation therapy for non-melanoma skin cancer.    \\n\\nWritten informed consent has been previously obtained from this patient for this treatment.  This consent is documented in the patient’s chart.  The patient gave verbal consent to continue treatment today. \\n\\nThe patient was treated with a specific radiation dose and setup as prescribed by the provider listed on this visit note.  A Radiation Therapist performed administration of radiation. The treatment parameters and cumulative dose are indicated above.  \\n\\nPrior to administering the radiation, the patient underwent a verification simulation defining relevant normal and abnormal target anatomy and acquiring images and data necessary to develop optimal radiation treatment process for the patient. This process includes verification of the treatment port(s) and proper treatment positioning.  All treatment ports were photographed.  The patient’s customized lead blocking was confirmed.   Considering, superficial radiotherapy setup is a clinical setup, this requires physician and radiation therapist to clarify location interest being treated against initial images, pathology and patient anatomy. Care was taken ensuring fields treated were geometrically accurate and properly positioned using daily verification simulation.  This process is also utilized to determine if any changes are necessary.   These steps are therefore medically necessary ensuring safe and effective administration of radiation. \\n\\nA high frequency ultrasound image was acquired today for two-dimensional evaluation of the tumor volume and response to treatment, in addition to geometric accuracy of field placement. The daily field placement is separate and distinct from the initial simulation and is an important task in providing safe administration of radiation therapy. Physician evaluation of the ultrasound tumor depth will be ongoing throughout the course of treatment and is deemed medically necessary in order to ensure the efficacy of treatment. Today’s image was evaluated for determination of continuation of treatment with the current plan or with necessary changes as appropriate. Additionally, the use of visualization and targeted assessment allows the patient to be able to see their cancer(s) progress, encouraging patient to complete full course of radiotherapy.  \\n\\nUS depth is 1.73mm (edema), which is 1.73mm in difference from previous imaging. Repop +++\\n\\nPer Dr. Esteban, continued daily US guidance and clinical setup simulation is required for field placement, measurement of tumor depth, progress and acute effect monitoring. \\n\\n

## 2020-06-16 NOTE — PROCEDURE: SUPERFICIAL RADIATION TREATMENT
Initial Radiation Treatment Planning (Will Render If Bill Simulation = Yes): The patient had a complete consultation regarding all applicable modalities for the treatment of their skin cancer and based on a variety of factors including the type of tumor, size, and location, the relevant medical history as well as local tissue factors, the functional status of the individual, the ability to perform necessary postoperative wound instructions and the need for simultaneous treatments as well as overall wound healing status, it was determined that the patient would begin radiation therapy treatment for skin cancer.  A full simulation and treatment device design was performed including the determination and formulation of appropriate simple and complex devices including lead shield of 0.762 mm thickness to form molded customized shielding to specifically correlate with the lesion size including treatment margin.  The custom lead shield is adequate to accommodate the appropriate applicator and provide adequate shielding around the treatment site.  The specific field applicator, shields, and devices both simple and complex as well as the specific patient setup is outlined below.  The patient was given a full consent for superficial radiation to both verbally and in writing and the full determination of patient's eligibility for treatment and selection is outlined on the patient eligibility and treatment selection form.  The specific superficial radiotherapy prescription was determined and was documented on the superficial radiotherapy prescription form.  A treatment calculation was also performed and documented on the treatment calculation form.  Based on the prescription, the patient was scheduled for a series of fractional treatments.
Simple Simulation Preamble Text Will Be Included With Simple Simulations (.......... Indications): Simple simulation was performed today for the following reasons:
Cumulative Dose In Cgy (Optional): 2648.8
Daily Fractionated Dose (Optional- Include Units): 264.88cGy
Port Dimensions-X Axis In Cm: 2
Field Size (Applicator): 2.5 cm
Fractions / Week Rx 2: 3
Include Rx 3 When Rendering Additional Prescriptions: No
Energy (Optional-Please Include Units) Rx 2: 50kV
Ssd In Cm (Optional): 15
Detail Level: Detailed
Time Dose Fractionation (Optional- Include Units If Applicable): 97
Field Size (Applicator) Rx 2: 2.0 cm
Custom Shielding Afterword Text Will Not Be Included With Simple Simulations (X X Y Cm............): port to correlate with the lesion size, including treatment margin. The custom lead shield is adequate to accommodate the appropriate applicator and provide adequate shielding around the treatment site. Additional shielding (as noted below) is used to protect sensitive, normal tissues.
Computed Treatment Time In Min (Will Render The Same As Calculated Treatment Time If Left Blank): 0.43
Comments: On Target, RTOG 1
Fractions / Week Rx 4: 5
Assessment: Appropriate reaction
Number Of Days Off Treatment: 1
Shielding Size (Optional- Include Units) Rx 2: 1.5cm x 1.5cm
Depth (Optional-Please Include Units) Rx 2: 0.7mm
Energy (Optional-Please Include Units): 70kV
Information: Selecting Yes will display possible errors in your note based on the variables you have selected. This validation is only offered as a suggestion for you. PLEASE NOTE THAT THE VALIDATION TEXT WILL BE REMOVED WHEN YOU FINALIZE YOUR NOTE. IF YOU WANT TO FAX A PRELIMINARY NOTE YOU WILL NEED TO TOGGLE THIS TO 'NO' IF YOU DO NOT WANT IT IN YOUR FAXED NOTE.
Dose / Tx In Cgy (Optional): 264.88
Total Number Of Fractions Rx 2: 10
Day Of The Week Treatment Administered: Tuesday
Bill For Radiation Treatment: Yes
Total Dose (Optional-Please Include Units) Rx 2: 2682.8cGy
Custom Shielding Preamble Text Will Not Be Included With Simple Simulations (.......... X X Y Cm): A lead shield of 0.762 mm thickness is utilized to form a molded, custom shield with a
Daily Fractionated Dose (Optional- Include Units) Rx 2: 268.28cGy
Total Number Of Fractions: 20
Treatment Time / Fractionation (Optional- Include Units): 0.43min
Shielding Size (Optional- Include Units): 2.0cm x 2.0cm
Intro Statement (Will Not Render If Left Blank): The patient is undergoing superficial radiation therapy for skin cancer and presents for weekly evaluation and management.  Per protocol and as documented on the flow sheet, the patient was questioned as to subjective redness, pruritus, pain, drainage, fatigue, or any other symptoms.  Objectively, the radiation area was evaluated with regards to erythema, atrophy, scale, crusting, erosion, ulceration, edema, purpura, tenderness, warmth, drainage, and any other findings.  The plan was extensively reviewed including the dose, and dosing schedule.  The simulation and clinical setup was also reviewed as was the external and any internal shields and based on this review the appropriateness and sufficiency of treatment was determined.
Depth (Optional-Please Include Units): 0.8mm
Fractions / Week: 4
Total Dose (Optional-Please Include Units): 5297.6cGy
Treatment Margins In Cm: 0.5
Functional Status: 0 (fully active)
Patient Positioning: Supine
Treatment Time / Fractionation (Optional- Include Units) Rx 2: 0.38min
Time Dose Fractionation (Optional- Include Units If Applicable) Rx 2: 47
Treatment Device Design After Initial Simulation Justification (Will Render If Bill For Treatment Devices = Yes): The patient is status post radiation simulation and is evaluated as to the use of additional devices for shielding and placement for radiation therapy.

## 2020-06-18 ENCOUNTER — APPOINTMENT (OUTPATIENT)
Age: 85
Setting detail: DERMATOLOGY
End: 2020-06-23

## 2020-06-18 VITALS — TEMPERATURE: 97.7 F

## 2020-06-18 PROBLEM — C44.321 SQUAMOUS CELL CARCINOMA OF SKIN OF NOSE: Status: ACTIVE | Noted: 2020-06-18

## 2020-06-18 PROCEDURE — OTHER TREATMENT REGIMEN: OTHER

## 2020-06-18 PROCEDURE — OTHER SUPERFICIAL RADIATION TREATMENT: OTHER

## 2020-06-18 PROCEDURE — 77401 RADIATION TX DELIVERY SUPFC: CPT

## 2020-06-18 PROCEDURE — G6001 ECHO GUIDANCE RADIOTHERAPY: HCPCS

## 2020-06-18 PROCEDURE — 77280 THER RAD SIMULAJ FIELD SMPL: CPT

## 2020-06-18 PROCEDURE — OTHER FOLLOW UP FOR NEXT VISIT: OTHER

## 2020-06-18 NOTE — PROCEDURE: SUPERFICIAL RADIATION TREATMENT
Port Dimensions-Y Axis In Cm: 2
Detail Level: Detailed
Computed Treatment Time In Min (Will Render The Same As Calculated Treatment Time If Left Blank): 0.43
Include Rx 2 When Rendering Additional Prescriptions: No
Time Dose Fractionation (Optional- Include Units If Applicable): 97
Treatment Device Design After Initial Simulation Justification (Will Render If Bill For Treatment Devices = Yes): The patient is status post radiation simulation and is evaluated as to the use of additional devices for shielding and placement for radiation therapy.
Ssd In Cm (Optional): 15
Field Size (Applicator) Rx 2: 2.0 cm
Initial Radiation Treatment Planning (Will Render If Bill Simulation = Yes): The patient had a complete consultation regarding all applicable modalities for the treatment of their skin cancer and based on a variety of factors including the type of tumor, size, and location, the relevant medical history as well as local tissue factors, the functional status of the individual, the ability to perform necessary postoperative wound instructions and the need for simultaneous treatments as well as overall wound healing status, it was determined that the patient would begin radiation therapy treatment for skin cancer.  A full simulation and treatment device design was performed including the determination and formulation of appropriate simple and complex devices including lead shield of 0.762 mm thickness to form molded customized shielding to specifically correlate with the lesion size including treatment margin.  The custom lead shield is adequate to accommodate the appropriate applicator and provide adequate shielding around the treatment site.  The specific field applicator, shields, and devices both simple and complex as well as the specific patient setup is outlined below.  The patient was given a full consent for superficial radiation to both verbally and in writing and the full determination of patient's eligibility for treatment and selection is outlined on the patient eligibility and treatment selection form.  The specific superficial radiotherapy prescription was determined and was documented on the superficial radiotherapy prescription form.  A treatment calculation was also performed and documented on the treatment calculation form.  Based on the prescription, the patient was scheduled for a series of fractional treatments.
Custom Shielding Afterword Text Will Not Be Included With Simple Simulations (X X Y Cm............): port to correlate with the lesion size, including treatment margin. The custom lead shield is adequate to accommodate the appropriate applicator and provide adequate shielding around the treatment site. Additional shielding (as noted below) is used to protect sensitive, normal tissues.
Field Size (Applicator): 2.5 cm
Information: Selecting Yes will display possible errors in your note based on the variables you have selected. This validation is only offered as a suggestion for you. PLEASE NOTE THAT THE VALIDATION TEXT WILL BE REMOVED WHEN YOU FINALIZE YOUR NOTE. IF YOU WANT TO FAX A PRELIMINARY NOTE YOU WILL NEED TO TOGGLE THIS TO 'NO' IF YOU DO NOT WANT IT IN YOUR FAXED NOTE.
Fractions / Week Rx 2: 3
Day Of The Week Treatment Administered: Thursday
Number Of Days Off Treatment: 1
Custom Shielding Preamble Text Will Not Be Included With Simple Simulations (.......... X X Y Cm): A lead shield of 0.762 mm thickness is utilized to form a molded, custom shield with a
Shielding Size (Optional- Include Units) Rx 2: 1.5cm x 1.5cm
Energy (Optional-Please Include Units): 70kV
Shielding Size (Optional- Include Units): 2.0cm x 2.0cm
Fractionation Number: 11
Dose / Tx In Cgy (Optional): 264.88
Assessment: Appropriate reaction
Bill For Radiation Treatment: Yes
Total Number Of Fractions Rx 2: 10
Total Dose (Optional-Please Include Units) Rx 2: 2682.8cGy
Daily Fractionated Dose (Optional- Include Units) Rx 2: 268.28cGy
Fractions / Week Rx 3: 5
Depth (Optional-Please Include Units) Rx 2: 0.7mm
Total Number Of Fractions: 20
Treatment Margins In Cm: 0.5
Intro Statement (Will Not Render If Left Blank): The patient is undergoing superficial radiation therapy for skin cancer and presents for weekly evaluation and management.  Per protocol and as documented on the flow sheet, the patient was questioned as to subjective redness, pruritus, pain, drainage, fatigue, or any other symptoms.  Objectively, the radiation area was evaluated with regards to erythema, atrophy, scale, crusting, erosion, ulceration, edema, purpura, tenderness, warmth, drainage, and any other findings.  The plan was extensively reviewed including the dose, and dosing schedule.  The simulation and clinical setup was also reviewed as was the external and any internal shields and based on this review the appropriateness and sufficiency of treatment was determined.
Fractions / Week: 4
Cumulative Dose In Cgy (Optional): 2913.68
Treatment Time / Fractionation (Optional- Include Units): 0.43min
Patient Positioning: Supine
Energy (Optional-Please Include Units) Rx 2: 50kV
Depth (Optional-Please Include Units): 0.8mm
Treatment Time / Fractionation (Optional- Include Units) Rx 2: 0.38min
Simple Simulation Preamble Text Will Be Included With Simple Simulations (.......... Indications): Simple simulation was performed today for the following reasons:
Time Dose Fractionation (Optional- Include Units If Applicable) Rx 2: 47
Total Dose (Optional-Please Include Units): 5297.6cGy
Daily Fractionated Dose (Optional- Include Units): 264.88cGy
Comments: On Target, RTOG 1
Functional Status: 0 (fully active)

## 2020-06-18 NOTE — PROCEDURE: TREATMENT REGIMEN
Detail Level: Detailed
Plan: This patient has been treated today with superficial radiation therapy for non-melanoma skin cancer.    \\n\\nWritten informed consent has been previously obtained from this patient for this treatment.  This consent is documented in the patient’s chart.  The patient gave verbal consent to continue treatment today. \\n\\nThe patient was treated with a specific radiation dose and setup as prescribed by the provider listed on this visit note.  A Radiation Therapist performed administration of radiation. The treatment parameters and cumulative dose are indicated above.  \\n\\nPrior to administering the radiation, the patient underwent a verification simulation defining relevant normal and abnormal target anatomy and acquiring images and data necessary to develop optimal radiation treatment process for the patient. This process includes verification of the treatment port(s) and proper treatment positioning.  All treatment ports were photographed.  The patient’s customized lead blocking was confirmed.   Considering, superficial radiotherapy setup is a clinical setup, this requires physician and radiation therapist to clarify location interest being treated against initial images, pathology and patient anatomy. Care was taken ensuring fields treated were geometrically accurate and properly positioned using daily verification simulation.  This process is also utilized to determine if any changes are necessary.   These steps are therefore medically necessary ensuring safe and effective administration of radiation. \\n\\nA high frequency ultrasound image was acquired today for two-dimensional evaluation of the tumor volume and response to treatment, in addition to geometric accuracy of field placement. The daily field placement is separate and distinct from the initial simulation and is an important task in providing safe administration of radiation therapy. Physician evaluation of the ultrasound tumor depth will be ongoing throughout the course of treatment and is deemed medically necessary in order to ensure the efficacy of treatment. Today’s image was evaluated for determination of continuation of treatment with the current plan or with necessary changes as appropriate. Additionally, the use of visualization and targeted assessment allows the patient to be able to see their cancer(s) progress, encouraging patient to complete full course of radiotherapy.  \\n\\nUS depth is 1.52mm (edema), which is -0.21mm in difference from previous imaging. Repop +++\\n\\nPer Dr. Esteban, continued daily US guidance and clinical setup simulation is required for field placement, measurement of tumor depth, progress and acute effect monitoring. \\n\\n

## 2020-06-19 ENCOUNTER — APPOINTMENT (OUTPATIENT)
Age: 85
Setting detail: DERMATOLOGY
End: 2020-06-23

## 2020-06-19 VITALS — TEMPERATURE: 97.9 F

## 2020-06-19 PROBLEM — C44.321 SQUAMOUS CELL CARCINOMA OF SKIN OF NOSE: Status: ACTIVE | Noted: 2020-06-19

## 2020-06-19 PROCEDURE — 77401 RADIATION TX DELIVERY SUPFC: CPT

## 2020-06-19 PROCEDURE — G6001 ECHO GUIDANCE RADIOTHERAPY: HCPCS

## 2020-06-19 PROCEDURE — OTHER FOLLOW UP FOR NEXT VISIT: OTHER

## 2020-06-19 PROCEDURE — OTHER SUPERFICIAL RADIATION TREATMENT: OTHER

## 2020-06-19 PROCEDURE — 77280 THER RAD SIMULAJ FIELD SMPL: CPT

## 2020-06-19 PROCEDURE — OTHER TREATMENT REGIMEN: OTHER

## 2020-06-19 NOTE — PROCEDURE: TREATMENT REGIMEN
Detail Level: Detailed
Plan: This patient has been treated today with superficial radiation therapy for non-melanoma skin cancer.    \\n\\nWritten informed consent has been previously obtained from this patient for this treatment.  This consent is documented in the patient’s chart.  The patient gave verbal consent to continue treatment today. \\n\\nThe patient was treated with a specific radiation dose and setup as prescribed by the provider listed on this visit note.  A Radiation Therapist performed administration of radiation. The treatment parameters and cumulative dose are indicated above.  \\n\\nPrior to administering the radiation, the patient underwent a verification simulation defining relevant normal and abnormal target anatomy and acquiring images and data necessary to develop optimal radiation treatment process for the patient. This process includes verification of the treatment port(s) and proper treatment positioning.  All treatment ports were photographed.  The patient’s customized lead blocking was confirmed.   Considering, superficial radiotherapy setup is a clinical setup, this requires physician and radiation therapist to clarify location interest being treated against initial images, pathology and patient anatomy. Care was taken ensuring fields treated were geometrically accurate and properly positioned using daily verification simulation.  This process is also utilized to determine if any changes are necessary.   These steps are therefore medically necessary ensuring safe and effective administration of radiation. \\n\\nA high frequency ultrasound image was acquired today for two-dimensional evaluation of the tumor volume and response to treatment, in addition to geometric accuracy of field placement. The daily field placement is separate and distinct from the initial simulation and is an important task in providing safe administration of radiation therapy. Physician evaluation of the ultrasound tumor depth will be ongoing throughout the course of treatment and is deemed medically necessary in order to ensure the efficacy of treatment. Today’s image was evaluated for determination of continuation of treatment with the current plan or with necessary changes as appropriate. Additionally, the use of visualization and targeted assessment allows the patient to be able to see their cancer(s) progress, encouraging patient to complete full course of radiotherapy.  \\n\\nUS depth is 1.68mm (edema), which is 0.16mm in difference from previous imaging. Repop ++\\n\\nPer Dr. Esteban, continued daily US guidance and clinical setup simulation is required for field placement, measurement of tumor depth, progress and acute effect monitoring. \\n\\n

## 2020-06-19 NOTE — PROCEDURE: SUPERFICIAL RADIATION TREATMENT
Include Rx 2 When Rendering Additional Prescriptions: No
Total Number Of Fractions Rx 4: 15
Custom Shielding Preamble Text Will Not Be Included With Simple Simulations (.......... X X Y Cm): A lead shield of 0.762 mm thickness is utilized to form a molded, custom shield with a
Assessment: Appropriate reaction
Daily Fractionated Dose (Optional- Include Units) Rx 2: 268.28cGy
Shielding Size (Optional- Include Units) Rx 2: 1.5cm x 1.5cm
Time Dose Fractionation (Optional- Include Units If Applicable): 97
Initial Radiation Treatment Planning (Will Render If Bill Simulation = Yes): The patient had a complete consultation regarding all applicable modalities for the treatment of their skin cancer and based on a variety of factors including the type of tumor, size, and location, the relevant medical history as well as local tissue factors, the functional status of the individual, the ability to perform necessary postoperative wound instructions and the need for simultaneous treatments as well as overall wound healing status, it was determined that the patient would begin radiation therapy treatment for skin cancer.  A full simulation and treatment device design was performed including the determination and formulation of appropriate simple and complex devices including lead shield of 0.762 mm thickness to form molded customized shielding to specifically correlate with the lesion size including treatment margin.  The custom lead shield is adequate to accommodate the appropriate applicator and provide adequate shielding around the treatment site.  The specific field applicator, shields, and devices both simple and complex as well as the specific patient setup is outlined below.  The patient was given a full consent for superficial radiation to both verbally and in writing and the full determination of patient's eligibility for treatment and selection is outlined on the patient eligibility and treatment selection form.  The specific superficial radiotherapy prescription was determined and was documented on the superficial radiotherapy prescription form.  A treatment calculation was also performed and documented on the treatment calculation form.  Based on the prescription, the patient was scheduled for a series of fractional treatments.
Total Dose (Optional-Please Include Units) Rx 2: 2682.8cGy
Energy (Optional-Please Include Units): 70kV
Field Size (Applicator): 2.5 cm
Treatment Time / Fractionation (Optional- Include Units): 0.43min
Fractions / Week Rx 2: 3
Fractionation Number: 12
Shielding Size (Optional- Include Units): 2.0cm x 2.0cm
Detail Level: Detailed
Depth (Optional-Please Include Units): 0.8mm
Number Of Days Off Treatment: 1
Total Number Of Fractions Rx 2: 10
Field Size (Applicator) Rx 2: 2.0 cm
Dose Per Fractionation In Cgy (Optional): 264.88
Custom Shielding Afterword Text Will Not Be Included With Simple Simulations (X X Y Cm............): port to correlate with the lesion size, including treatment margin. The custom lead shield is adequate to accommodate the appropriate applicator and provide adequate shielding around the treatment site. Additional shielding (as noted below) is used to protect sensitive, normal tissues.
Total Dose (Optional-Please Include Units): 5297.6cGy
Treatment Margins In Cm: 0.5
Patient Positioning: Supine
Port Dimensions-Y Axis In Cm: 2
Treatment Time / Fractionation (Optional- Include Units) Rx 2: 0.38min
Simple Simulation Preamble Text Will Be Included With Simple Simulations (.......... Indications): Simple simulation was performed today for the following reasons:
Cumulative Dose In Cgy (Optional): 3178.56
Daily Fractionated Dose (Optional- Include Units): 264.88cGy
Fractions / Week Rx 3: 5
Depth (Optional-Please Include Units) Rx 2: 0.7mm
Bill For Radiation Treatment: Yes
Energy (Optional-Please Include Units) Rx 2: 50kV
Intro Statement (Will Not Render If Left Blank): The patient is undergoing superficial radiation therapy for skin cancer and presents for weekly evaluation and management.  Per protocol and as documented on the flow sheet, the patient was questioned as to subjective redness, pruritus, pain, drainage, fatigue, or any other symptoms.  Objectively, the radiation area was evaluated with regards to erythema, atrophy, scale, crusting, erosion, ulceration, edema, purpura, tenderness, warmth, drainage, and any other findings.  The plan was extensively reviewed including the dose, and dosing schedule.  The simulation and clinical setup was also reviewed as was the external and any internal shields and based on this review the appropriateness and sufficiency of treatment was determined.
Fractions / Week: 4
Treatment Device Design After Initial Simulation Justification (Will Render If Bill For Treatment Devices = Yes): The patient is status post radiation simulation and is evaluated as to the use of additional devices for shielding and placement for radiation therapy.
Time Dose Fractionation (Optional- Include Units If Applicable) Rx 2: 47
Total Number Of Fractions: 20
Comments: On Target, RTOG 1
Day Of The Week Treatment Administered: Friday
Computed Treatment Time In Min (Will Render The Same As Calculated Treatment Time If Left Blank): 0.43
Functional Status: 0 (fully active)
Information: Selecting Yes will display possible errors in your note based on the variables you have selected. This validation is only offered as a suggestion for you. PLEASE NOTE THAT THE VALIDATION TEXT WILL BE REMOVED WHEN YOU FINALIZE YOUR NOTE. IF YOU WANT TO FAX A PRELIMINARY NOTE YOU WILL NEED TO TOGGLE THIS TO 'NO' IF YOU DO NOT WANT IT IN YOUR FAXED NOTE.

## 2020-06-22 ENCOUNTER — APPOINTMENT (OUTPATIENT)
Age: 85
Setting detail: DERMATOLOGY
End: 2020-06-23

## 2020-06-22 VITALS — TEMPERATURE: 97.9 F

## 2020-06-22 PROBLEM — C44.321 SQUAMOUS CELL CARCINOMA OF SKIN OF NOSE: Status: ACTIVE | Noted: 2020-06-22

## 2020-06-22 PROCEDURE — OTHER SUPERFICIAL RADIATION TREATMENT: OTHER

## 2020-06-22 PROCEDURE — OTHER TREATMENT REGIMEN: OTHER

## 2020-06-22 PROCEDURE — OTHER FOLLOW UP FOR NEXT VISIT: OTHER

## 2020-06-22 PROCEDURE — G6001 ECHO GUIDANCE RADIOTHERAPY: HCPCS

## 2020-06-22 PROCEDURE — 77401 RADIATION TX DELIVERY SUPFC: CPT

## 2020-06-22 PROCEDURE — 77280 THER RAD SIMULAJ FIELD SMPL: CPT

## 2020-06-22 NOTE — PROCEDURE: TREATMENT REGIMEN
Detail Level: Detailed
Plan: This patient has been treated today with superficial radiation therapy for non-melanoma skin cancer.    \\n\\nWritten informed consent has been previously obtained from this patient for this treatment.  This consent is documented in the patient’s chart.  The patient gave verbal consent to continue treatment today. \\n\\nThe patient was treated with a specific radiation dose and setup as prescribed by the provider listed on this visit note.  A Radiation Therapist performed administration of radiation. The treatment parameters and cumulative dose are indicated above.  \\n\\nPrior to administering the radiation, the patient underwent a verification simulation defining relevant normal and abnormal target anatomy and acquiring images and data necessary to develop optimal radiation treatment process for the patient. This process includes verification of the treatment port(s) and proper treatment positioning.  All treatment ports were photographed.  The patient’s customized lead blocking was confirmed.   Considering, superficial radiotherapy setup is a clinical setup, this requires physician and radiation therapist to clarify location interest being treated against initial images, pathology and patient anatomy. Care was taken ensuring fields treated were geometrically accurate and properly positioned using daily verification simulation.  This process is also utilized to determine if any changes are necessary.   These steps are therefore medically necessary ensuring safe and effective administration of radiation. \\n\\nA high frequency ultrasound image was acquired today for two-dimensional evaluation of the tumor volume and response to treatment, in addition to geometric accuracy of field placement. The daily field placement is separate and distinct from the initial simulation and is an important task in providing safe administration of radiation therapy. Physician evaluation of the ultrasound tumor depth will be ongoing throughout the course of treatment and is deemed medically necessary in order to ensure the efficacy of treatment. Today’s image was evaluated for determination of continuation of treatment with the current plan or with necessary changes as appropriate. Additionally, the use of visualization and targeted assessment allows the patient to be able to see their cancer(s) progress, encouraging patient to complete full course of radiotherapy.  \\n\\nUS depth is 1.65mm (edema), which is -0.03mm in difference from previous imaging. Repop +++\\n\\nPer Dr. Esteban, continued daily US guidance and clinical setup simulation is required for field placement, measurement of tumor depth, progress and acute effect monitoring. \\n\\n

## 2020-06-22 NOTE — PROCEDURE: SUPERFICIAL RADIATION TREATMENT
Day Of The Week Treatment Administered: Monday
Fractions / Week Rx 2: 3
Total Number Of Fractions Rx 4: 15
Bill And Render Text From Evaluation And Management Tab (Will Bill 77712): No
Custom Shielding Preamble Text Will Not Be Included With Simple Simulations (.......... X X Y Cm): A lead shield of 0.762 mm thickness is utilized to form a molded, custom shield with a
Number Of Days Off Treatment: 1
Total Dose (Optional-Please Include Units) Rx 2: 2682.8cGy
Shielding Size (Optional- Include Units) Rx 2: 1.5cm x 1.5cm
Field Size (Applicator) Rx 2: 2.0 cm
Treatment Time In Min (Optional): 0.43
Dose / Tx In Cgy (Optional): 264.88
Total Number Of Fractions Rx 2: 10
Shielding Size (Optional- Include Units): 2.0cm x 2.0cm
Fractionation Number: 13
Assessment: Appropriate reaction
Energy (Optional-Please Include Units): 70kV
Total Number Of Fractions: 20
Daily Fractionated Dose (Optional- Include Units) Rx 2: 268.28cGy
Fractions / Week Rx 3: 5
Treatment Time / Fractionation (Optional- Include Units): 0.43min
Patient Positioning: Supine
Field Size (Applicator): 2.5 cm
Depth (Optional-Please Include Units) Rx 2: 0.7mm
Bill For Radiation Treatment: Yes
Intro Statement (Will Not Render If Left Blank): The patient is undergoing superficial radiation therapy for skin cancer and presents for weekly evaluation and management.  Per protocol and as documented on the flow sheet, the patient was questioned as to subjective redness, pruritus, pain, drainage, fatigue, or any other symptoms.  Objectively, the radiation area was evaluated with regards to erythema, atrophy, scale, crusting, erosion, ulceration, edema, purpura, tenderness, warmth, drainage, and any other findings.  The plan was extensively reviewed including the dose, and dosing schedule.  The simulation and clinical setup was also reviewed as was the external and any internal shields and based on this review the appropriateness and sufficiency of treatment was determined.
Fractions / Week: 4
Depth (Optional-Please Include Units): 0.8mm
Treatment Time / Fractionation (Optional- Include Units) Rx 2: 0.38min
Simple Simulation Afterword Text Will Be Included With Simple Simulations (Indications............): The patient had a complete consultation regarding all applicable modalities for the treatment of their skin cancer and based on a variety of factors including the type of tumor, size, and location, the relevant medical history as well as local tissue factors, the functional status of the individual, the ability to perform necessary postoperative wound instructions and the need for simultaneous treatments as well as overall wound healing status, it was determined that the patient would begin radiation therapy treatment for skin cancer.  A full simulation and treatment device design was performed including the determination and formulation of appropriate simple and complex devices including lead shield of 0.762 mm thickness to form molded customized shielding to specifically correlate with the lesion size including treatment margin.  The custom lead shield is adequate to accommodate the appropriate applicator and provide adequate shielding around the treatment site.  The specific field applicator, shields, and devices both simple and complex as well as the specific patient setup is outlined below.  The patient was given a full consent for superficial radiation to both verbally and in writing and the full determination of patient's eligibility for treatment and selection is outlined on the patient eligibility and treatment selection form.  The specific superficial radiotherapy prescription was determined and was documented on the superficial radiotherapy prescription form.  A treatment calculation was also performed and documented on the treatment calculation form.  Based on the prescription, the patient was scheduled for a series of fractional treatments.
Total Dose (Optional-Please Include Units): 5297.6cGy
Simple Simulation Preamble Text Will Be Included With Simple Simulations (.......... Indications): Simple simulation was performed today for the following reasons:
Cumulative Dose In Cgy (Optional): 3443.44
Treatment Margins In Cm: 0.5
Energy (Optional-Please Include Units) Rx 2: 50kV
Time Dose Fractionation (Optional- Include Units If Applicable) Rx 2: 47
Port Dimensions-Y Axis In Cm: 2
Daily Fractionated Dose (Optional- Include Units): 264.88cGy
Comments: On Target, RTOG 1
Functional Status: 0 (fully active)
Detail Level: Detailed
Time Dose Fractionation (Optional- Include Units If Applicable): 97
Treatment Device Design After Initial Simulation Justification (Will Render If Bill For Treatment Devices = Yes): The patient is status post radiation simulation and is evaluated as to the use of additional devices for shielding and placement for radiation therapy.
Information: Selecting Yes will display possible errors in your note based on the variables you have selected. This validation is only offered as a suggestion for you. PLEASE NOTE THAT THE VALIDATION TEXT WILL BE REMOVED WHEN YOU FINALIZE YOUR NOTE. IF YOU WANT TO FAX A PRELIMINARY NOTE YOU WILL NEED TO TOGGLE THIS TO 'NO' IF YOU DO NOT WANT IT IN YOUR FAXED NOTE.
Custom Shielding Afterword Text Will Not Be Included With Simple Simulations (X X Y Cm............): port to correlate with the lesion size, including treatment margin. The custom lead shield is adequate to accommodate the appropriate applicator and provide adequate shielding around the treatment site. Additional shielding (as noted below) is used to protect sensitive, normal tissues.

## 2020-06-23 ENCOUNTER — APPOINTMENT (OUTPATIENT)
Age: 85
Setting detail: DERMATOLOGY
End: 2020-06-23

## 2020-06-23 VITALS — TEMPERATURE: 97.9 F

## 2020-06-23 PROBLEM — C44.321 SQUAMOUS CELL CARCINOMA OF SKIN OF NOSE: Status: ACTIVE | Noted: 2020-06-23

## 2020-06-23 PROCEDURE — OTHER SUPERFICIAL RADIATION TREATMENT: OTHER

## 2020-06-23 PROCEDURE — OTHER TREATMENT REGIMEN: OTHER

## 2020-06-23 PROCEDURE — OTHER FOLLOW UP FOR NEXT VISIT: OTHER

## 2020-06-23 PROCEDURE — 77280 THER RAD SIMULAJ FIELD SMPL: CPT

## 2020-06-23 PROCEDURE — 77401 RADIATION TX DELIVERY SUPFC: CPT

## 2020-06-23 NOTE — PROCEDURE: TREATMENT REGIMEN
Detail Level: Detailed
Plan: This patient has been treated today superficial radiation therapy for non-melanoma skin cancer. Written informed consent has been previously obtained from this patient for this treatment. This consent is documented in the patient’s chart. The patient gave verbal consent to continue treatment today.\\n\\nThe patient was treated with a specific radiation dose and setup as prescribed by the provider listed on this visit note. A Radiation Therapist performed administration of radiation under supervision of provider. The treatment parameters and cumulative dose are indicated above.\\n\\nPrior to administering the radiation, the patient underwent a verification therapeutic radiology simulation-aided field setting defining relevant normal and abnormal target anatomy and acquiring data necessary developing optimal radiation treatment process for the patient. This process includes verification of the treatment port(s) and proper treatment positioning. All treatment ports were photographed within electronic medical record. The patient’s customized lead blocking along with gross tumor volume and margin was confirmed. Considering superficial radiotherapy is clinical in setup, this requires physician and radiation therapist to clarify location interest being treated against initial images, pathology and patient anatomy. Care was taken ensuring fields treated were geometrically accurate and properly positioned using therapeutic radiology simulation-aided field setting verification per fraction. This process is also utilized to determine if any prescription or setup changes are necessary. These steps are therefore medically necessary ensuring safe and effective administration of radiation. Ongoing therapeutic radiology simulation-aided field setting verification is ordered throughout course of therapy.\\n\\nThe field placement, per fraction, is separate and distinct from the initial simulation, and is an important task in providing safe administration of superficial radiation therapy. Physician evaluation will be ongoing throughout the course of treatment and is deemed medically necessary in order to ensure the efficacy of treatment and any necessary changes. \\n\\nAccording to provider review of verification therapeutic radiology simulation-aided field setting and imaging, NO change is required \\n\\nNo US Depth due to skin reaction\\n\\nPer Dr. Esteban, continued therapeutic radiology simulation-aided field setting verification per fraction is required for field placement, progress and acute effect monitoring.\\n\\n

## 2020-06-23 NOTE — PROCEDURE: SUPERFICIAL RADIATION TREATMENT
Fractions / Week Rx 4: 5
Field Size (Applicator): 2.5 cm
Fractions / Week Rx 2: 3
Computed Treatment Time In Min (Will Render The Same As Calculated Treatment Time If Left Blank): 0.43
Render Prescriptions In Note?: No
Comments: On [employer], RTOG 1
Ssd In Cm (Optional): 15
Number Of Days Off Treatment: 1
Field Size (Applicator) Rx 2: 2.0 cm
Shielding Size (Optional- Include Units) Rx 2: 1.5cm x 1.5cm
Information: Selecting Yes will display possible errors in your note based on the variables you have selected. This validation is only offered as a suggestion for you. PLEASE NOTE THAT THE VALIDATION TEXT WILL BE REMOVED WHEN YOU FINALIZE YOUR NOTE. IF YOU WANT TO FAX A PRELIMINARY NOTE YOU WILL NEED TO TOGGLE THIS TO 'NO' IF YOU DO NOT WANT IT IN YOUR FAXED NOTE.
Energy (Optional-Please Include Units): 70kV
Day Of The Week Treatment Administered: Tuesday
Dose / Tx In Cgy (Optional): 264.88
Total Number Of Fractions Rx 2: 10
Assessment: Appropriate reaction
Total Dose (Optional-Please Include Units) Rx 2: 2682.8cGy
Custom Shielding Preamble Text Will Not Be Included With Simple Simulations (.......... X X Y Cm): A lead shield of 0.762 mm thickness is utilized to form a molded, custom shield with a
Daily Fractionated Dose (Optional- Include Units) Rx 2: 268.28cGy
Total Number Of Fractions: 20
Depth (Optional-Please Include Units) Rx 2: 0.7mm
Treatment Time / Fractionation (Optional- Include Units): 0.43min
Shielding Size (Optional- Include Units): 2.0cm x 2.0cm
Fractionation Number: 14
Intro Statement (Will Not Render If Left Blank): The patient is undergoing superficial radiation therapy for skin cancer and presents for weekly evaluation and management.  Per protocol and as documented on the flow sheet, the patient was questioned as to subjective redness, pruritus, pain, drainage, fatigue, or any other symptoms.  Objectively, the radiation area was evaluated with regards to erythema, atrophy, scale, crusting, erosion, ulceration, edema, purpura, tenderness, warmth, drainage, and any other findings.  The plan was extensively reviewed including the dose, and dosing schedule.  The simulation and clinical setup was also reviewed as was the external and any internal shields and based on this review the appropriateness and sufficiency of treatment was determined.
Bill For Radiation Treatment: Yes
Depth (Optional-Please Include Units): 0.8mm
Total Dose (Optional-Please Include Units): 5297.6cGy
Fractions / Week: 4
Treatment Margins In Cm: 0.5
Simple Simulation Afterword Text Will Be Included With Simple Simulations (Indications............): The patient had a complete consultation regarding all applicable modalities for the treatment of their skin cancer and based on a variety of factors including the type of tumor, size, and location, the relevant medical history as well as local tissue factors, the functional status of the individual, the ability to perform necessary postoperative wound instructions and the need for simultaneous treatments as well as overall wound healing status, it was determined that the patient would begin radiation therapy treatment for skin cancer.  A full simulation and treatment device design was performed including the determination and formulation of appropriate simple and complex devices including lead shield of 0.762 mm thickness to form molded customized shielding to specifically correlate with the lesion size including treatment margin.  The custom lead shield is adequate to accommodate the appropriate applicator and provide adequate shielding around the treatment site.  The specific field applicator, shields, and devices both simple and complex as well as the specific patient setup is outlined below.  The patient was given a full consent for superficial radiation to both verbally and in writing and the full determination of patient's eligibility for treatment and selection is outlined on the patient eligibility and treatment selection form.  The specific superficial radiotherapy prescription was determined and was documented on the superficial radiotherapy prescription form.  A treatment calculation was also performed and documented on the treatment calculation form.  Based on the prescription, the patient was scheduled for a series of fractional treatments.
Patient Positioning: Supine
Port Dimensions-Y Axis In Cm: 2
Treatment Time / Fractionation (Optional- Include Units) Rx 2: 0.38min
Time Dose Fractionation (Optional- Include Units If Applicable) Rx 2: 47
Simple Simulation Preamble Text Will Be Included With Simple Simulations (.......... Indications): Simple simulation was performed today for the following reasons:
Cumulative Dose In Cgy (Optional): 3708.32
Functional Status: 0 (fully active)
Daily Fractionated Dose (Optional- Include Units): 264.88cGy
Energy (Optional-Please Include Units) Rx 2: 50kV
Detail Level: Detailed
Treatment Device Design After Initial Simulation Justification (Will Render If Bill For Treatment Devices = Yes): The patient is status post radiation simulation and is evaluated as to the use of additional devices for shielding and placement for radiation therapy.
Time Dose Fractionation (Optional- Include Units If Applicable): 97
Custom Shielding Afterword Text Will Not Be Included With Simple Simulations (X X Y Cm............): port to correlate with the lesion size, including treatment margin. The custom lead shield is adequate to accommodate the appropriate applicator and provide adequate shielding around the treatment site. Additional shielding (as noted below) is used to protect sensitive, normal tissues.

## 2020-06-25 ENCOUNTER — APPOINTMENT (OUTPATIENT)
Age: 85
Setting detail: DERMATOLOGY
End: 2020-06-30

## 2020-06-25 VITALS — TEMPERATURE: 97.9 F

## 2020-06-25 PROBLEM — C44.321 SQUAMOUS CELL CARCINOMA OF SKIN OF NOSE: Status: ACTIVE | Noted: 2020-06-25

## 2020-06-25 PROCEDURE — OTHER SUPERFICIAL RADIATION TREATMENT: OTHER

## 2020-06-25 PROCEDURE — 77300 RADIATION THERAPY DOSE PLAN: CPT

## 2020-06-25 PROCEDURE — 77427 RADIATION TX MANAGEMENT X5: CPT

## 2020-06-25 PROCEDURE — OTHER TREATMENT REGIMEN: OTHER

## 2020-06-25 PROCEDURE — OTHER FOLLOW UP FOR NEXT VISIT: OTHER

## 2020-06-25 NOTE — PROCEDURE: TREATMENT REGIMEN
Detail Level: Detailed
Plan: Per the request of Dr. Esteban, this patient was seen today for Superficial Radiation Therapy management. Today’s setup was evaluated, determining continuation of treatment with the current plan, or necessary changes as appropriate. All appropriate custom blocking and treatment parameters were verified by the Radiation therapist according to initial simulation. \\n\\nEvaluation for response and reaction to SRT based on current fraction and cumulative dose with a visual inspection demonstrates an excessive response. Patient on break until 6/30/2020 per Luis Rangel NP.\\n\\nNo U/S due to scab
Modify Regimen: Per the request of Dr Esteban dose decay calculation for patient for 7 days of no treatment delivery due to skin reaction. Dose decay calculation determines a decay factor of 0.98. TDF of delivered fractions prior to break in treatment is 68. TDF of delivered fractions with dose decay calculated is 66.64. TDF of remaining fractions is 32. Overall, TDF for entire course of treatment after calculation is 98.64. The patient will be treated for a total of 21 fractions. Dose decay calculation is clinically necessary to determine appropriate changes to prescription, if necessary, for efficacy of treatment.\\n\\n

## 2020-06-25 NOTE — PROCEDURE: SUPERFICIAL RADIATION TREATMENT
Daily Fractionated Dose (Optional- Include Units): 264.88cGy
Total Number Of Fractions: 20
Original Tdf: 97
Comments: On [employer], RTOG 2, patient on break until 6/30/2020 per Luis Rangel NP. Patient will be reduced to 3 fractions per week for remaining fractions.
Functional Status: 0 (fully active)
Fractions / Week Rx 4: 5
Render Patient Eligibility And Selection In Note?: No
Decay Factor: 0.98
Date Treatment Started: 06/01/2020
Detail Level: Detailed
Energy (Optional-Please Include Units): 70kV
Port Dimensions-X Axis In Cm: 2
Tdf Of Fractionations Delivered: 68
Treatment Device Design After Initial Simulation Justification (Will Render If Bill For Treatment Devices = Yes): The patient is status post radiation simulation and is evaluated as to the use of additional devices for shielding and placement for radiation therapy.
Initial Radiation Treatment Planning (Will Render If Bill Simulation = Yes): The patient had a complete consultation regarding all applicable modalities for the treatment of their skin cancer and based on a variety of factors including the type of tumor, size, and location, the relevant medical history as well as local tissue factors, the functional status of the individual, the ability to perform necessary postoperative wound instructions and the need for simultaneous treatments as well as overall wound healing status, it was determined that the patient would begin radiation therapy treatment for skin cancer.  A full simulation and treatment device design was performed including the determination and formulation of appropriate simple and complex devices including lead shield of 0.762 mm thickness to form molded customized shielding to specifically correlate with the lesion size including treatment margin.  The custom lead shield is adequate to accommodate the appropriate applicator and provide adequate shielding around the treatment site.  The specific field applicator, shields, and devices both simple and complex as well as the specific patient setup is outlined below.  The patient was given a full consent for superficial radiation to both verbally and in writing and the full determination of patient's eligibility for treatment and selection is outlined on the patient eligibility and treatment selection form.  The specific superficial radiotherapy prescription was determined and was documented on the superficial radiotherapy prescription form.  A treatment calculation was also performed and documented on the treatment calculation form.  Based on the prescription, the patient was scheduled for a series of fractional treatments.
Reason For Treatment Interruption: Skin Reaction
Day Of The Week Treatment Administered: Thursday
Number Of Treatment Days: 23
Field Size (Applicator): 2.5 cm
Total Number Of Fractions Rx 4: 15
Fractions / Week Rx 2: 3
Computed Treatment Time In Min (Will Render The Same As Calculated Treatment Time If Left Blank): 0.43
Fractionation Number (Evaluation): 14
Custom Shielding Preamble Text Will Not Be Included With Simple Simulations (.......... X X Y Cm): A lead shield of 0.762 mm thickness is utilized to form a molded, custom shield with a
Date Interruption Stopped: 06/30/2020
Total Dose (Optional-Please Include Units) Rx 2: 2682.8cGy
Number Of Days Off Treatment: 7
Information: Selecting Yes will display possible errors in your note based on the variables you have selected. This validation is only offered as a suggestion for you. PLEASE NOTE THAT THE VALIDATION TEXT WILL BE REMOVED WHEN YOU FINALIZE YOUR NOTE. IF YOU WANT TO FAX A PRELIMINARY NOTE YOU WILL NEED TO TOGGLE THIS TO 'NO' IF YOU DO NOT WANT IT IN YOUR FAXED NOTE.
Shielding Size (Optional- Include Units) Rx 2: 1.5cm x 1.5cm
Dimensions-X Axis In Cm: 1
Shielding Size (Optional- Include Units): 2.0cm x 2.0cm
Dose / Tx In Cgy (Optional): 264.88
Total Number Of Fractions Rx 2: 10
Assessment: Excessive reaction
Custom Shielding Afterword Text Will Not Be Included With Simple Simulations (X X Y Cm............): port to correlate with the lesion size, including treatment margin. The custom lead shield is adequate to accommodate the appropriate applicator and provide adequate shielding around the treatment site. Additional shielding (as noted below) is used to protect sensitive, normal tissues.
Date Interruption Started: 06/23/2020
Field Size (Applicator) Rx 2: 2.0 cm
Patient Positioning: Supine
Daily Fractionated Dose (Optional- Include Units) Rx 2: 268.28cGy
Treatment Time / Fractionation (Optional- Include Units): 0.43min
Depth (Optional-Please Include Units) Rx 2: 0.7mm
Treatment Time / Fractionation (Optional- Include Units) Rx 2: 0.38min
Simple Simulation Preamble Text Will Be Included With Simple Simulations (.......... Indications): Simple simulation was performed today for the following reasons:
Depth (Optional-Please Include Units): 0.8mm
Render Text From Evaluation And Management Tab (Will Not Bill [id number]): Yes
Intro Statement (Will Not Render If Left Blank): The patient is undergoing superficial radiation therapy for skin cancer and presents for weekly evaluation and management.  Per protocol and as documented on the flow sheet, the patient was questioned as to subjective redness, pruritus, pain, drainage, fatigue, or any other symptoms.  Objectively, the radiation area was evaluated with regards to erythema, atrophy, scale, crusting, erosion, ulceration, edema, purpura, tenderness, warmth, drainage, and any other findings.  The plan was extensively reviewed including the dose, and dosing schedule.  The simulation and clinical setup was also reviewed as was the external and any internal shields and based on this review the appropriateness and sufficiency of treatment was determined.
Total Dose (Optional-Please Include Units): 5297.6cGy
Fractions / Week: 4
Treatment Margins In Cm: 0.5
Energy (Optional-Please Include Units) Rx 2: 50kV
Cumulative Dose In Cgy (Optional): 3708.32
Time Dose Fractionation (Optional- Include Units If Applicable) Rx 2: 47

## 2020-06-30 ENCOUNTER — APPOINTMENT (OUTPATIENT)
Age: 85
Setting detail: DERMATOLOGY
End: 2020-06-30

## 2020-06-30 VITALS — TEMPERATURE: 98.1 F

## 2020-06-30 DIAGNOSIS — L58.9 RADIODERMATITIS, UNSPECIFIED: ICD-10-CM

## 2020-06-30 PROCEDURE — OTHER TREATMENT REGIMEN: OTHER

## 2020-06-30 PROCEDURE — OTHER COUNSELING: OTHER

## 2020-06-30 PROCEDURE — OTHER MIPS QUALITY: OTHER

## 2020-06-30 PROCEDURE — 99213 OFFICE O/P EST LOW 20 MIN: CPT

## 2020-06-30 PROCEDURE — OTHER SUPERFICIAL RADIATION TREATMENT: OTHER

## 2020-06-30 PROCEDURE — OTHER FOLLOW UP FOR NEXT VISIT: OTHER

## 2020-06-30 ASSESSMENT — LOCATION ZONE DERM: LOCATION ZONE: NOSE

## 2020-06-30 ASSESSMENT — LOCATION DETAILED DESCRIPTION DERM: LOCATION DETAILED: RIGHT NASAL DORSUM

## 2020-06-30 ASSESSMENT — LOCATION SIMPLE DESCRIPTION DERM: LOCATION SIMPLE: NOSE

## 2020-06-30 NOTE — PROCEDURE: SUPERFICIAL RADIATION TREATMENT
Date Interruption Stopped: 06/30/2020
Detail Level: Detailed
Dose Per Fractionation In Cgy (Optional): 264.88
Include Rx 2 When Rendering Additional Prescriptions: No
Ssd In Cm (Optional): 15
Custom Shielding Preamble Text Will Not Be Included With Simple Simulations (.......... X X Y Cm): A lead shield of 0.762 mm thickness is utilized to form a molded, custom shield with a
Field Size (Applicator) Rx 2: 2.0 cm
Daily Fractionated Dose (Optional- Include Units) Rx 2: 268.28cGy
Date Interruption Started: 06/23/2020
Custom Shielding Afterword Text Will Not Be Included With Simple Simulations (X X Y Cm............): port to correlate with the lesion size, including treatment margin. The custom lead shield is adequate to accommodate the appropriate applicator and provide adequate shielding around the treatment site. Additional shielding (as noted below) is used to protect sensitive, normal tissues.
Dimensions-X Axis In Cm: 1
Field Size (Applicator): 2.5 cm
Time Dose Fractionation (Optional- Include Units If Applicable): 97
Depth (Optional-Please Include Units) Rx 2: 0.7mm
Patient Positioning: Supine
Shielding Size (Optional- Include Units): 2.0cm x 2.0cm
Reason For Treatment Interruption: Skin Reaction
Fractionation Number: 16
Treatment Time / Fractionation (Optional- Include Units) Rx 2: 0.38min
Assessment: Excessive reaction
Number Of Days Off Treatment: 7
Depth (Optional-Please Include Units): 0.8mm
Shielding Size (Optional- Include Units) Rx 2: 1.5cm x 1.5cm
Energy (Optional-Please Include Units): 70kV
Simple Simulation Preamble Text Will Be Included With Simple Simulations (.......... Indications): Simple simulation was performed today for the following reasons:
Treatment Margins In Cm: 0.5
Treatment Time / Fractionation (Optional- Include Units): 0.43min
Cumulative Dose In Cgy (Optional): 3973.2
Bill For Radiation Treatment: Yes
Total Number Of Fractions Rx 2: 10
Simple Simulation Afterword Text Will Be Included With Simple Simulations (Indications............): The patient had a complete consultation regarding all applicable modalities for the treatment of their skin cancer and based on a variety of factors including the type of tumor, size, and location, the relevant medical history as well as local tissue factors, the functional status of the individual, the ability to perform necessary postoperative wound instructions and the need for simultaneous treatments as well as overall wound healing status, it was determined that the patient would begin radiation therapy treatment for skin cancer.  A full simulation and treatment device design was performed including the determination and formulation of appropriate simple and complex devices including lead shield of 0.762 mm thickness to form molded customized shielding to specifically correlate with the lesion size including treatment margin.  The custom lead shield is adequate to accommodate the appropriate applicator and provide adequate shielding around the treatment site.  The specific field applicator, shields, and devices both simple and complex as well as the specific patient setup is outlined below.  The patient was given a full consent for superficial radiation to both verbally and in writing and the full determination of patient's eligibility for treatment and selection is outlined on the patient eligibility and treatment selection form.  The specific superficial radiotherapy prescription was determined and was documented on the superficial radiotherapy prescription form.  A treatment calculation was also performed and documented on the treatment calculation form.  Based on the prescription, the patient was scheduled for a series of fractional treatments.
Total Dose (Optional-Please Include Units): 5297.6cGy
Daily Fractionated Dose (Optional- Include Units): 264.88cGy
Total Number Of Fractions: 20
Functional Status: 0 (fully active)
Fractions / Week Rx 3: 5
Energy (Optional-Please Include Units) Rx 2: 50kV
Intro Statement (Will Not Render If Left Blank): The patient is undergoing superficial radiation therapy for skin cancer and presents for weekly evaluation and management.  Per protocol and as documented on the flow sheet, the patient was questioned as to subjective redness, pruritus, pain, drainage, fatigue, or any other symptoms.  Objectively, the radiation area was evaluated with regards to erythema, atrophy, scale, crusting, erosion, ulceration, edema, purpura, tenderness, warmth, drainage, and any other findings.  The plan was extensively reviewed including the dose, and dosing schedule.  The simulation and clinical setup was also reviewed as was the external and any internal shields and based on this review the appropriateness and sufficiency of treatment was determined.
Port Dimensions-Y Axis In Cm: 2
Tdf Of Fractionations Delivered: 68
Decay Factor: 0.98
Fractions / Week: 4
Date Treatment Started: 06/01/2020
Comments: On [employer], RTOG 2, patient on break until 6/30/2020 per Luis Rangel NP. Patient will be reduced to 3 fractions per week for remaining fractions.
Computed Treatment Time In Min (Will Render The Same As Calculated Treatment Time If Left Blank): 0.43
Fractionation Number (Evaluation): 14
Number Of Treatment Days: 23
Treatment Device Design After Initial Simulation Justification (Will Render If Bill For Treatment Devices = Yes): The patient is status post radiation simulation and is evaluated as to the use of additional devices for shielding and placement for radiation therapy.
Total Dose (Optional-Please Include Units) Rx 2: 2682.8cGy
Information: Selecting Yes will display possible errors in your note based on the variables you have selected. This validation is only offered as a suggestion for you. PLEASE NOTE THAT THE VALIDATION TEXT WILL BE REMOVED WHEN YOU FINALIZE YOUR NOTE. IF YOU WANT TO FAX A PRELIMINARY NOTE YOU WILL NEED TO TOGGLE THIS TO 'NO' IF YOU DO NOT WANT IT IN YOUR FAXED NOTE.
Time Dose Fractionation (Optional- Include Units If Applicable) Rx 2: 47
Fractions / Week Rx 2: 3
Day Of The Week Treatment Administered: Tuesday

## 2020-06-30 NOTE — HPI: SKIN LESION
What Type Of Note Output Would You Prefer (Optional)?: Standard Output
How Severe Is Your Skin Lesion?: mild
[email or web address]
Is This A New Presentation, Or A Follow-Up?: Skin Lesion
When Was It Treated?: 6/25/20

## 2020-08-11 ENCOUNTER — APPOINTMENT (OUTPATIENT)
Age: 85
Setting detail: DERMATOLOGY
End: 2020-08-20

## 2020-08-11 ENCOUNTER — APPOINTMENT (OUTPATIENT)
Age: 85
Setting detail: DERMATOLOGY
End: 2020-08-11

## 2020-08-11 DIAGNOSIS — L57.0 ACTINIC KERATOSIS: ICD-10-CM

## 2020-08-11 PROBLEM — L23.7 ALLERGIC CONTACT DERMATITIS DUE TO PLANTS, EXCEPT FOOD: Status: ACTIVE | Noted: 2020-08-11

## 2020-08-11 PROBLEM — C44.321 SQUAMOUS CELL CARCINOMA OF SKIN OF NOSE: Status: ACTIVE | Noted: 2020-08-11

## 2020-08-11 PROCEDURE — OTHER MIPS QUALITY: OTHER

## 2020-08-11 PROCEDURE — OTHER COUNSELING: OTHER

## 2020-08-11 PROCEDURE — G6001 ECHO GUIDANCE RADIOTHERAPY: HCPCS

## 2020-08-11 PROCEDURE — OTHER TREATMENT REGIMEN: OTHER

## 2020-08-11 PROCEDURE — OTHER FOLLOW UP FOR NEXT VISIT: OTHER

## 2020-08-11 PROCEDURE — OTHER SUPERFICIAL RADIATION TREATMENT: OTHER

## 2020-08-11 PROCEDURE — 77427 RADIATION TX MANAGEMENT X5: CPT

## 2020-08-11 PROCEDURE — 17003 DESTRUCT PREMALG LES 2-14: CPT

## 2020-08-11 PROCEDURE — 17000 DESTRUCT PREMALG LESION: CPT

## 2020-08-11 PROCEDURE — OTHER LIQUID NITROGEN: OTHER

## 2020-08-11 ASSESSMENT — LOCATION DETAILED DESCRIPTION DERM
LOCATION DETAILED: RIGHT LATERAL FOREHEAD
LOCATION DETAILED: LEFT LATERAL FOREHEAD
LOCATION DETAILED: RIGHT CENTRAL MALAR CHEEK
LOCATION DETAILED: LEFT INFERIOR TEMPLE
LOCATION DETAILED: LEFT SUPERIOR LATERAL MALAR CHEEK
LOCATION DETAILED: LEFT FOREHEAD
LOCATION DETAILED: RIGHT INFERIOR CENTRAL MALAR CHEEK
LOCATION DETAILED: LEFT INFERIOR LATERAL FOREHEAD

## 2020-08-11 ASSESSMENT — LOCATION SIMPLE DESCRIPTION DERM
LOCATION SIMPLE: RIGHT CHEEK
LOCATION SIMPLE: LEFT TEMPLE
LOCATION SIMPLE: LEFT FOREHEAD
LOCATION SIMPLE: LEFT CHEEK
LOCATION SIMPLE: RIGHT FOREHEAD

## 2020-08-11 ASSESSMENT — LOCATION ZONE DERM: LOCATION ZONE: FACE

## 2020-08-11 NOTE — PROCEDURE: SUPERFICIAL RADIATION TREATMENT
Ricky For Simulation Without Treatment Device Design (Simple Simulation): No
Energy (Optional-Please Include Units) Rx 2: 50kV
Port Dimensions-X Axis In Cm: 2
Cumulative Dose In Cgy (Optional): 3708.32
Tdf Of Fractionations Delivered: 68
Time Dose Fractionation (Optional- Include Units If Applicable) Rx 2: 47
Simple Simulation Afterword Text Will Be Included With Simple Simulations (Indications............): The patient had a complete consultation regarding all applicable modalities for the treatment of their skin cancer and based on a variety of factors including the type of tumor, size, and location, the relevant medical history as well as local tissue factors, the functional status of the individual, the ability to perform necessary postoperative wound instructions and the need for simultaneous treatments as well as overall wound healing status, it was determined that the patient would begin radiation therapy treatment for skin cancer.  A full simulation and treatment device design was performed including the determination and formulation of appropriate simple and complex devices including lead shield of 0.762 mm thickness to form molded customized shielding to specifically correlate with the lesion size including treatment margin.  The custom lead shield is adequate to accommodate the appropriate applicator and provide adequate shielding around the treatment site.  The specific field applicator, shields, and devices both simple and complex as well as the specific patient setup is outlined below.  The patient was given a full consent for superficial radiation to both verbally and in writing and the full determination of patient's eligibility for treatment and selection is outlined on the patient eligibility and treatment selection form.  The specific superficial radiotherapy prescription was determined and was documented on the superficial radiotherapy prescription form.  A treatment calculation was also performed and documented on the treatment calculation form.  Based on the prescription, the patient was scheduled for a series of fractional treatments.
Daily Fractionated Dose (Optional- Include Units): 264.88cGy
Original Tdf: 97
Comments: Excellent Clearance
Fractions / Week Rx 4: 5
Dimensions-Y Axis In Cm: 1
Fractionation Number (Evaluation): 14
Number Of Treatment Days: 23
Depth (Optional-Please Include Units) Rx 2: 0.7mm
Decay Factor: 0.98
Date Treatment Started: 06/01/2020
Energy (Include Units): 70kV
Field Size (Applicator): 2.5 cm
Day Of The Week Treatment Administered: Thursday
Reason For Treatment Interruption: Skin Reaction
Date Treatment Resumed: 06/30/2020
Assessment: Appropriate reaction
Computed Treatment Time In Min (Will Render The Same As Calculated Treatment Time If Left Blank): 0.43
Total Number Of Fractions Rx 4: 15
Total Dose (Optional-Please Include Units) Rx 2: 2682.8cGy
Custom Shielding Preamble Text Will Not Be Included With Simple Simulations (.......... X X Y Cm): A lead shield of 0.762 mm thickness is utilized to form a molded, custom shield with a
Information: Selecting Yes will display possible errors in your note based on the variables you have selected. This validation is only offered as a suggestion for you. PLEASE NOTE THAT THE VALIDATION TEXT WILL BE REMOVED WHEN YOU FINALIZE YOUR NOTE. IF YOU WANT TO FAX A PRELIMINARY NOTE YOU WILL NEED TO TOGGLE THIS TO 'NO' IF YOU DO NOT WANT IT IN YOUR FAXED NOTE.
Detail Level: Detailed
Shielding Size (Optional- Include Units) Rx 2: 1.5cm x 1.5cm
Number Of Days Off Treatment: 7
Field Size (Applicator) Rx 2: 2.0 cm
Custom Shielding Afterword Text Will Not Be Included With Simple Simulations (X X Y Cm............): port to correlate with the lesion size, including treatment margin. The custom lead shield is adequate to accommodate the appropriate applicator and provide adequate shielding around the treatment site. Additional shielding (as noted below) is used to protect sensitive, normal tissues.
Shielding Size (Optional- Include Units): 2.0cm x 2.0cm
Date Treatment Stopped: 06/23/2020
Dose / Tx In Cgy (Optional): 264.88
Bill And Render Text From Evaluation And Management Tab (Will Bill 52727): Yes
Total Number Of Fractions Rx 2: 10
Functional Status: 0 (fully active)
Patient Positioning: Supine
Daily Fractionated Dose (Optional- Include Units) Rx 2: 268.28cGy
Treatment Time / Fractionation (Optional- Include Units): 0.43min
Treatment Time / Fractionation (Optional- Include Units) Rx 2: 0.38min
Treatment Device Design After Initial Simulation Justification (Will Render If Bill For Treatment Devices = Yes): The patient is status post radiation simulation and is evaluated as to the use of additional devices for shielding and placement for radiation therapy.
Depth (Optional-Please Include Units): 0.8mm
Fractions / Week Rx 2: 3
Simple Simulation Preamble Text Will Be Included With Simple Simulations (.......... Indications): Simple simulation was performed today for the following reasons:
Intro Statement (Will Not Render If Left Blank): The patient is undergoing superficial radiation therapy for skin cancer and presents for weekly evaluation and management.  Per protocol and as documented on the flow sheet, the patient was questioned as to subjective redness, pruritus, pain, drainage, fatigue, or any other symptoms.  Objectively, the radiation area was evaluated with regards to erythema, atrophy, scale, crusting, erosion, ulceration, edema, purpura, tenderness, warmth, drainage, and any other findings.  The plan was extensively reviewed including the dose, and dosing schedule.  The simulation and clinical setup was also reviewed as was the external and any internal shields and based on this review the appropriateness and sufficiency of treatment was determined.
Fractions / Week: 4
Total Dose (Optional-Please Include Units): 5297.6cGy
Total Number Of Fractions: 20
Treatment Margins In Cm: 0.5

## 2020-08-11 NOTE — PROCEDURE: LIQUID NITROGEN
Consent: The patient's consent was obtained including but not limited to risks of crusting, scabbing, blistering, scarring, darker or lighter pigmentary change, recurrence, incomplete removal and infection.
Render Note In Bullet Format When Appropriate: No
Post-Care Instructions: I reviewed with the patient in detail post-care instructions. Patient is to wear sunprotection, and avoid picking at any of the treated lesions. Pt may apply Vaseline to crusted or scabbing areas.
Detail Level: Detailed
Number Of Freeze-Thaw Cycles: 3 freeze-thaw cycles
Duration Of Freeze Thaw-Cycle (Seconds): 1

## 2020-08-11 NOTE — PROCEDURE: TREATMENT REGIMEN
Detail Level: Detailed
Modify Regimen: \\n\\n
Plan: Per the request of Dr. Esteban, patient was seen today for Superficial Radiation Therapy management.  A high frequency ultrasound image was acquired prior to treatment today for three-dimensional evaluation of tumor volume and response to treatment, in addition, geometric accuracy of field placement (CPT® ). Physician evaluation of the ultrasound tumor depth is ongoing through treatment and is deemed medically necessary ensuring efficacy of treatment. 6 weeks after finishing SRT, evaluation and management of SRT based on prescription and cumulative dose for reaction and response to radiation reveals adequate healing and response with pleasing aesthetics results.  No evidence of cancerous lesion on or around treatment site visible on ultrasound or dermoscopy. RTOG = 0\\n\\nUS depth 0.0mm, Repop +++\\n\\n

## 2020-11-03 ENCOUNTER — APPOINTMENT (OUTPATIENT)
Age: 85
Setting detail: DERMATOLOGY
End: 2020-11-03

## 2020-11-03 VITALS — TEMPERATURE: 97.3 F

## 2020-11-03 DIAGNOSIS — L57.8 OTHER SKIN CHANGES DUE TO CHRONIC EXPOSURE TO NONIONIZING RADIATION: ICD-10-CM

## 2020-11-03 DIAGNOSIS — L82.1 OTHER SEBORRHEIC KERATOSIS: ICD-10-CM

## 2020-11-03 DIAGNOSIS — Z85.828 PERSONAL HISTORY OF OTHER MALIGNANT NEOPLASM OF SKIN: ICD-10-CM

## 2020-11-03 DIAGNOSIS — L72.0 EPIDERMAL CYST: ICD-10-CM

## 2020-11-03 DIAGNOSIS — L57.0 ACTINIC KERATOSIS: ICD-10-CM

## 2020-11-03 DIAGNOSIS — L81.4 OTHER MELANIN HYPERPIGMENTATION: ICD-10-CM

## 2020-11-03 DIAGNOSIS — L82.0 INFLAMED SEBORRHEIC KERATOSIS: ICD-10-CM

## 2020-11-03 PROCEDURE — OTHER COUNSELING: OTHER

## 2020-11-03 PROCEDURE — 99213 OFFICE O/P EST LOW 20 MIN: CPT | Mod: 25

## 2020-11-03 PROCEDURE — 17000 DESTRUCT PREMALG LESION: CPT | Mod: 59

## 2020-11-03 PROCEDURE — OTHER LIQUID NITROGEN: OTHER

## 2020-11-03 PROCEDURE — OTHER ACNE SURGERY: OTHER

## 2020-11-03 PROCEDURE — 17003 DESTRUCT PREMALG LES 2-14: CPT | Mod: 59

## 2020-11-03 PROCEDURE — 10040 ACNE SURGERY: CPT

## 2020-11-03 PROCEDURE — OTHER MIPS QUALITY: OTHER

## 2020-11-03 PROCEDURE — 17110 DESTRUCT B9 LESION 1-14: CPT

## 2020-11-03 ASSESSMENT — LOCATION ZONE DERM
LOCATION ZONE: FACE
LOCATION ZONE: NOSE
LOCATION ZONE: TRUNK
LOCATION ZONE: SCALP

## 2020-11-03 ASSESSMENT — LOCATION SIMPLE DESCRIPTION DERM
LOCATION SIMPLE: LEFT FOREHEAD
LOCATION SIMPLE: NOSE
LOCATION SIMPLE: RIGHT UPPER BACK
LOCATION SIMPLE: LEFT UPPER BACK
LOCATION SIMPLE: RIGHT CHEEK
LOCATION SIMPLE: LEFT CLAVICULAR SKIN
LOCATION SIMPLE: ANTERIOR SCALP
LOCATION SIMPLE: RIGHT FOREHEAD
LOCATION SIMPLE: LEFT CHEEK
LOCATION SIMPLE: LEFT ZYGOMA

## 2020-11-03 ASSESSMENT — LOCATION DETAILED DESCRIPTION DERM
LOCATION DETAILED: RIGHT SUPERIOR UPPER BACK
LOCATION DETAILED: RIGHT LATERAL MALAR CHEEK
LOCATION DETAILED: LEFT NASAL DORSUM
LOCATION DETAILED: LEFT INFERIOR CENTRAL MALAR CHEEK
LOCATION DETAILED: RIGHT CENTRAL MALAR CHEEK
LOCATION DETAILED: MID-FRONTAL SCALP
LOCATION DETAILED: LEFT MEDIAL UPPER BACK
LOCATION DETAILED: NASAL DORSUM
LOCATION DETAILED: RIGHT NASAL ROOT
LOCATION DETAILED: LEFT CENTRAL ZYGOMA
LOCATION DETAILED: LEFT SUPERIOR MEDIAL FOREHEAD
LOCATION DETAILED: RIGHT SUPERIOR FOREHEAD
LOCATION DETAILED: NASAL ROOT
LOCATION DETAILED: RIGHT SUPERIOR CENTRAL MALAR CHEEK
LOCATION DETAILED: LEFT CLAVICULAR SKIN
LOCATION DETAILED: RIGHT MEDIAL MALAR CHEEK

## 2020-11-03 NOTE — PROCEDURE: LIQUID NITROGEN
Add 52 Modifier (Optional): no
Duration Of Freeze Thaw-Cycle (Seconds): 1
Number Of Freeze-Thaw Cycles: 3 freeze-thaw cycles
Consent: The patient's consent was obtained including but not limited to risks of crusting, scabbing, blistering, scarring, darker or lighter pigmentary change, recurrence, incomplete removal and infection.
Detail Level: Detailed
Medical Necessity Clause: This procedure was medically necessary because the lesions that were treated were:
Medical Necessity Information: It is in your best interest to select a reason for this procedure from the list below. All of these items fulfill various CMS LCD requirements except the new and changing color options.
Detail Level: Zone
Post-Care Instructions: I reviewed with the patient in detail post-care instructions. Patient is to wear sunprotection, and avoid picking at any of the treated lesions. Pt may apply Vaseline to crusted or scabbing areas.

## 2020-11-03 NOTE — PROCEDURE: ACNE SURGERY
Render Number Of Lesions Treated: no
Post-Care Instructions: I reviewed with the patient in detail post-care instructions. Patient is to keep the treatment areaas dry overnight, and then apply bacitracin twice daily until healed. Patient may apply hydrogen peroxide soaks to remove any crusting.
Consent was obtained and risks were reviewed including but not limited to scarring, infection, bleeding, scabbing, incomplete removal, and allergy to anesthesia.
Prep Text (Optional): Prior to removal the treatment areas were prepped in the usual fashion.
Extraction Method: comedo extractor
Detail Level: Zone
Hemostasis: Drysol
Acne Type: Comedonal Lesions

## 2021-06-04 NOTE — PROCEDURE: TREATMENT REGIMEN
Chart(s)/Patient/Father
Detail Level: Detailed
Plan: This patient has been treated today with superficial radiation therapy for non-melanoma skin cancer.    \\n\\nWritten informed consent has been previously obtained from this patient for this treatment.  This consent is documented in the patient’s chart.  The patient gave verbal consent to continue treatment today. \\n\\nThe patient was treated with a specific radiation dose and setup as prescribed by the provider listed on this visit note.  A Radiation Therapist performed administration of radiation. The treatment parameters and cumulative dose are indicated above.  \\n\\nPrior to administering the radiation, the patient underwent a verification simulation defining relevant normal and abnormal target anatomy and acquiring images and data necessary to develop optimal radiation treatment process for the patient. This process includes verification of the treatment port(s) and proper treatment positioning.  All treatment ports were photographed.  The patient’s customized lead blocking was confirmed.   Considering, superficial radiotherapy setup is a clinical setup, this requires physician and radiation therapist to clarify location interest being treated against initial images, pathology and patient anatomy. Care was taken ensuring fields treated were geometrically accurate and properly positioned using daily verification simulation.  This process is also utilized to determine if any changes are necessary.   These steps are therefore medically necessary ensuring safe and effective administration of radiation. \\n\\nA high frequency ultrasound image was acquired today for two-dimensional evaluation of the tumor volume and response to treatment, in addition to geometric accuracy of field placement. The daily field placement is separate and distinct from the initial simulation and is an important task in providing safe administration of radiation therapy. Physician evaluation of the ultrasound tumor depth will be ongoing throughout the course of treatment and is deemed medically necessary in order to ensure the efficacy of treatment. Today’s image was evaluated for determination of continuation of treatment with the current plan or with necessary changes as appropriate. Additionally, the use of visualization and targeted assessment allows the patient to be able to see their cancer(s) progress, encouraging patient to complete full course of radiotherapy.  \\n\\nUS depth is 0.77mm, which is 0.08mm in difference from previous imaging. Repop +++\\n\\nPer Dr. Esteban, continued daily US guidance and clinical setup simulation is required for field placement, measurement of tumor depth, progress and acute effect monitoring. \\n\\n

## 2022-05-21 NOTE — PROCEDURE: SUPERFICIAL RADIATION TREATMENT
Validate Note Data (See Information Below): No
Computed Treatment Time In Min (Will Render The Same As Calculated Treatment Time If Left Blank): 0.43
Total Number Of Fractions: 20
Port Dimensions-Y Axis In Cm: 2
Fractions / Week Rx 3: 5
Depth (Optional-Please Include Units) Rx 2: 0.7mm
Information: Selecting Yes will display possible errors in your note based on the variables you have selected. This validation is only offered as a suggestion for you. PLEASE NOTE THAT THE VALIDATION TEXT WILL BE REMOVED WHEN YOU FINALIZE YOUR NOTE. IF YOU WANT TO FAX A PRELIMINARY NOTE YOU WILL NEED TO TOGGLE THIS TO 'NO' IF YOU DO NOT WANT IT IN YOUR FAXED NOTE.
Energy (Include Units): 70kV
Intro Statement (Will Not Render If Left Blank): The patient is undergoing superficial radiation therapy for skin cancer and presents for weekly evaluation and management.  Per protocol and as documented on the flow sheet, the patient was questioned as to subjective redness, pruritus, pain, drainage, fatigue, or any other symptoms.  Objectively, the radiation area was evaluated with regards to erythema, atrophy, scale, crusting, erosion, ulceration, edema, purpura, tenderness, warmth, drainage, and any other findings.  The plan was extensively reviewed including the dose, and dosing schedule.  The simulation and clinical setup was also reviewed as was the external and any internal shields and based on this review the appropriateness and sufficiency of treatment was determined.
Fractions / Week: 4
Day Of The Week Treatment Administered: Monday
Assessment: Appropriate reaction
Time Dose Fractionation (Optional- Include Units If Applicable) Rx 2: 47
Custom Shielding Preamble Text Will Not Be Included With Simple Simulations (.......... X X Y Cm): A lead shield of 0.762 mm thickness is utilized to form a molded, custom shield with a
Dimensions-Y Axis In Cm: 1
Shielding Size (Optional- Include Units): 2.0cm x 2.0cm
Field Size (Applicator): 2.5 cm
Render Text From Evaluation And Management Tab (Will Not Bill 19917): Yes
Total Number Of Fractions Rx 4: 15
Total Dose (Optional-Please Include Units) Rx 2: 2682.8cGy
Daily Fractionated Dose (Optional- Include Units) Rx 2: 268.28cGy
Treatment Margins In Cm: 0.5
Functional Status: 0 (fully active)
Treatment Time / Fractionation (Optional- Include Units): 0.43min
Field Size (Applicator) Rx 2: 2.0 cm
Shielding Size (Optional- Include Units) Rx 2: 1.5cm x 1.5cm
Custom Shielding Afterword Text Will Not Be Included With Simple Simulations (X X Y Cm............): port to correlate with the lesion size, including treatment margin. The custom lead shield is adequate to accommodate the appropriate applicator and provide adequate shielding around the treatment site. Additional shielding (as noted below) is used to protect sensitive, normal tissues.
Detail Level: Detailed
Treatment Device Design After Initial Simulation Justification (Will Render If Bill For Treatment Devices = Yes): The patient is status post radiation simulation and is evaluated as to the use of additional devices for shielding and placement for radiation therapy.
Time Dose Fractionation (Optional- Include Units If Applicable): 97
Initial Radiation Treatment Planning (Will Render If Bill Simulation = Yes): The patient had a complete consultation regarding all applicable modalities for the treatment of their skin cancer and based on a variety of factors including the type of tumor, size, and location, the relevant medical history as well as local tissue factors, the functional status of the individual, the ability to perform necessary postoperative wound instructions and the need for simultaneous treatments as well as overall wound healing status, it was determined that the patient would begin radiation therapy treatment for skin cancer.  A full simulation and treatment device design was performed including the determination and formulation of appropriate simple and complex devices including lead shield of 0.762 mm thickness to form molded customized shielding to specifically correlate with the lesion size including treatment margin.  The custom lead shield is adequate to accommodate the appropriate applicator and provide adequate shielding around the treatment site.  The specific field applicator, shields, and devices both simple and complex as well as the specific patient setup is outlined below.  The patient was given a full consent for superficial radiation to both verbally and in writing and the full determination of patient's eligibility for treatment and selection is outlined on the patient eligibility and treatment selection form.  The specific superficial radiotherapy prescription was determined and was documented on the superficial radiotherapy prescription form.  A treatment calculation was also performed and documented on the treatment calculation form.  Based on the prescription, the patient was scheduled for a series of fractional treatments.
Cumulative Dose In Cgy (Optional): 1324.4
Depth (Optional-Please Include Units): 0.8mm
Total Dose (Optional-Please Include Units): 5297.6cGy
21-Mar-2022
Fractions / Week Rx 2: 3
Dose Per Fractionation In Cgy (Optional): 264.88
Patient Positioning: Supine
Total Number Of Fractions Rx 2: 10
Treatment Time / Fractionation (Optional- Include Units) Rx 2: 0.38min
Energy (Optional-Please Include Units) Rx 2: 50kV
Simple Simulation Preamble Text Will Be Included With Simple Simulations (.......... Indications): Simple simulation was performed today for the following reasons:
Daily Fractionated Dose (Optional- Include Units): 264.88cGy
Comments: On [employer], RTOG 1